# Patient Record
Sex: FEMALE | Race: WHITE | Employment: FULL TIME | URBAN - METROPOLITAN AREA
[De-identification: names, ages, dates, MRNs, and addresses within clinical notes are randomized per-mention and may not be internally consistent; named-entity substitution may affect disease eponyms.]

---

## 2017-07-01 ENCOUNTER — APPOINTMENT (OUTPATIENT)
Dept: CT IMAGING | Age: 57
End: 2017-07-01
Attending: EMERGENCY MEDICINE
Payer: COMMERCIAL

## 2017-07-01 ENCOUNTER — HOSPITAL ENCOUNTER (EMERGENCY)
Age: 57
Discharge: HOME | End: 2017-07-01
Attending: EMERGENCY MEDICINE
Payer: COMMERCIAL

## 2017-07-01 VITALS
OXYGEN SATURATION: 98 % | WEIGHT: 293 LBS | DIASTOLIC BLOOD PRESSURE: 69 MMHG | HEIGHT: 67 IN | RESPIRATION RATE: 16 BRPM | SYSTOLIC BLOOD PRESSURE: 141 MMHG | HEART RATE: 72 BPM | TEMPERATURE: 98.2 F | BODY MASS INDEX: 45.99 KG/M2

## 2017-07-01 DIAGNOSIS — R10.11 ABDOMINAL PAIN, RIGHT UPPER QUADRANT: Primary | ICD-10-CM

## 2017-07-01 LAB
ANION GAP BLD CALC-SCNC: 11 MMOL/L (ref 5–15)
BUN SERPL-MCNC: 16 MG/DL (ref 6–20)
BUN/CREAT SERPL: 15 (ref 12–20)
CALCIUM SERPL-MCNC: 9.2 MG/DL (ref 8.5–10.1)
CHLORIDE SERPL-SCNC: 103 MMOL/L (ref 97–108)
CO2 SERPL-SCNC: 24 MMOL/L (ref 21–32)
CREAT SERPL-MCNC: 1.04 MG/DL (ref 0.55–1.02)
GLUCOSE SERPL-MCNC: 96 MG/DL (ref 65–100)
POTASSIUM SERPL-SCNC: 3.7 MMOL/L (ref 3.5–5.1)
SODIUM SERPL-SCNC: 138 MMOL/L (ref 136–145)

## 2017-07-01 PROCEDURE — 80048 BASIC METABOLIC PNL TOTAL CA: CPT | Performed by: EMERGENCY MEDICINE

## 2017-07-01 PROCEDURE — 74177 CT ABD & PELVIS W/CONTRAST: CPT

## 2017-07-01 PROCEDURE — 74011636320 HC RX REV CODE- 636/320: Performed by: EMERGENCY MEDICINE

## 2017-07-01 PROCEDURE — 99281 EMR DPT VST MAYX REQ PHY/QHP: CPT

## 2017-07-01 PROCEDURE — 36415 COLL VENOUS BLD VENIPUNCTURE: CPT | Performed by: EMERGENCY MEDICINE

## 2017-07-01 RX ORDER — GUAIFENESIN 100 MG/5ML
81 LIQUID (ML) ORAL DAILY
COMMUNITY
End: 2019-10-24

## 2017-07-01 RX ORDER — ZINC GLUCONATE 10 MG
1 LOZENGE ORAL DAILY
COMMUNITY
End: 2019-08-19

## 2017-07-01 RX ORDER — CHOLECALCIFEROL TAB 125 MCG (5000 UNIT) 125 MCG
5000 TAB ORAL DAILY
COMMUNITY
End: 2019-08-19

## 2017-07-01 RX ORDER — LISINOPRIL AND HYDROCHLOROTHIAZIDE 10; 12.5 MG/1; MG/1
1 TABLET ORAL DAILY
COMMUNITY
End: 2019-10-24

## 2017-07-01 RX ORDER — METFORMIN HYDROCHLORIDE 500 MG/1
1000 TABLET ORAL 2 TIMES DAILY WITH MEALS
COMMUNITY

## 2017-07-01 RX ORDER — FLAXSEED OIL 1000 MG
1 CAPSULE ORAL DAILY
COMMUNITY
End: 2019-08-19

## 2017-07-01 RX ORDER — LANOLIN ALCOHOL/MO/W.PET/CERES
1000 CREAM (GRAM) TOPICAL DAILY
COMMUNITY
End: 2019-08-19

## 2017-07-01 RX ADMIN — IOPAMIDOL 97 ML: 755 INJECTION, SOLUTION INTRAVENOUS at 15:11

## 2017-07-01 NOTE — DISCHARGE INSTRUCTIONS
Abdominal Pain: Care Instructions  Your Care Instructions    Abdominal pain has many possible causes. Some aren't serious and get better on their own in a few days. Others need more testing and treatment. If your pain continues or gets worse, you need to be rechecked and may need more tests to find out what is wrong. You may need surgery to correct the problem. Don't ignore new symptoms, such as fever, nausea and vomiting, urination problems, pain that gets worse, and dizziness. These may be signs of a more serious problem. Your doctor may have recommended a follow-up visit in the next 8 to 12 hours. If you are not getting better, you may need more tests or treatment. The doctor has checked you carefully, but problems can develop later. If you notice any problems or new symptoms, get medical treatment right away. Follow-up care is a key part of your treatment and safety. Be sure to make and go to all appointments, and call your doctor if you are having problems. It's also a good idea to know your test results and keep a list of the medicines you take. How can you care for yourself at home? · Rest until you feel better. · To prevent dehydration, drink plenty of fluids, enough so that your urine is light yellow or clear like water. Choose water and other caffeine-free clear liquids until you feel better. If you have kidney, heart, or liver disease and have to limit fluids, talk with your doctor before you increase the amount of fluids you drink. · If your stomach is upset, eat mild foods, such as rice, dry toast or crackers, bananas, and applesauce. Try eating several small meals instead of two or three large ones. · Wait until 48 hours after all symptoms have gone away before you have spicy foods, alcohol, and drinks that contain caffeine. · Do not eat foods that are high in fat. · Avoid anti-inflammatory medicines such as aspirin, ibuprofen (Advil, Motrin), and naproxen (Aleve).  These can cause stomach upset. Talk to your doctor if you take daily aspirin for another health problem. When should you call for help? Call 911 anytime you think you may need emergency care. For example, call if:  · You passed out (lost consciousness). · You pass maroon or very bloody stools. · You vomit blood or what looks like coffee grounds. · You have new, severe belly pain. Call your doctor now or seek immediate medical care if:  · Your pain gets worse, especially if it becomes focused in one area of your belly. · You have a new or higher fever. · Your stools are black and look like tar, or they have streaks of blood. · You have unexpected vaginal bleeding. · You have symptoms of a urinary tract infection. These may include:  ¨ Pain when you urinate. ¨ Urinating more often than usual.  ¨ Blood in your urine. · You are dizzy or lightheaded, or you feel like you may faint. Watch closely for changes in your health, and be sure to contact your doctor if:  · You are not getting better after 1 day (24 hours). Where can you learn more? Go to http://marilynSeeqjuan.info/. Enter O014 in the search box to learn more about \"Abdominal Pain: Care Instructions. \"  Current as of: March 20, 2017  Content Version: 11.3  © 2828-6911 [employer]. Care instructions adapted under license by [employer] (which disclaims liability or warranty for this information). If you have questions about a medical condition or this instruction, always ask your healthcare professional. [place] [address] any warranty or liability for your use of this information. We hope that we have addressed all of your medical concerns. The examination and treatment you received in the Emergency Department were for an emergent problem and were not intended as complete care. It is important that you follow up with your healthcare provider(s) for ongoing care.  If your symptoms worsen or do not improve as expected, and you are unable to reach your usual health care provider(s), you should return to the Emergency Department. Today's healthcare is undergoing tremendous change, and patient satisfaction surveys are one of the many tools to assess the quality of medical care. You may receive a survey from the [employer] regarding your experience in the Emergency Department. I hope that your experience has been completely positive, particularly the medical care that I provided. As such, please participate in the survey; anything less than excellent does not meet my expectations or intentions. [facility] and [facility] participate in nationally recognized quality of care measures. If your blood pressure is greater than 120/80, as reported below, we urge that you seek medical care to address the potential of high blood pressure, commonly known as hypertension. Hypertension can be hereditary or can be caused by certain medical conditions, pain, stress, or \"white coat syndrome. \"       Please make an appointment with your health care provider(s) for follow up of your Emergency Department visit. VITALS:   Patient Vitals for the past 8 hrs:   Temp Pulse Resp BP SpO2   07/01/17 1315 98.2 °F (36.8 °C) 80 16 137/82 98 %          Thank you for allowing us to provide you with medical care today. We realize that you have many choices for your emergency care needs. Please choose us in the future for any continued health care needs. Krysta Nesbitt, [address]: [phone]            Recent Results (from the past 24 hour(s))   METABOLIC PANEL, BASIC    Collection Time: 07/01/17  1:55 PM   Result Value Ref Range    Sodium 138 136 - 145 mmol/L    Potassium 3.7 3.5 - 5.1 mmol/L    Chloride 103 97 - 108 mmol/L    CO2 24 21 - 32 mmol/L    Anion gap 11 5 - 15 mmol/L    Glucose 96 65 - 100 mg/dL    BUN 16 6 - 20 MG/DL    Creatinine 1.04 (H) 0.55 - 1.02 MG/DL    BUN/Creatinine ratio 15 12 - 20      GFR est AA >60 >60 ml/min/1.73m2    GFR est non-AA 55 (L) >60 ml/min/1.73m2    Calcium 9.2 8.5 - 10.1 MG/DL       Ct Abd Pelv W Cont    Result Date: 7/1/2017  CT ABDOMEN AND PELVIS WITH CONTRAST. 7/1/2017 2:45 PM INDICATION: Right flank pain. HISTORY (per electronic medical record): Breast carcinoma, uterine carcinoma, hypertension, sarcoidosis, diabetes, renal calculi. COMPARISON: None. TECHNIQUE: CT of the abdomen and pelvis was performed after the administration of 97 cc IV Isovue-370. CT dose reduction was achieved through use of a standardized protocol tailored for this examination and automatic exposure control for dose modulation. Adaptive statistical iterative reconstruction (ASIR) was utilized. FINDINGS: The study is limited by patient body habitus: redundant soft tissue causes photon starvation, and the flank(s) touching the gantry cause(s) beam hardening artifact. Inferior chest: There are sub-6 mm nodules in the bilateral lower lung fields. The heart size is normal. Lymphadenopathy: A portacaval lymph node is enlarged, measuring 21 mm in short axis and 21 x 43 x 61 mm in 3 dimensions. Multiple supraceliac lymph nodes are also enlarged. There are more mildly enlarged inferior retroperitoneal lymph nodes. The largest of these is a right common iliac lymph node measuring 12 mm in short axis on image 3-64. A right cardiophrenic angle/pericaval lymph node is mildly enlarged (10 mm on image 3-13). Visible mesenteric lymph nodes are at the upper limits of normal in size. Abdomen: No urinary calculi. The pancreas is fatty infiltrated. The heart size is normal. The gallbladder is not visible and likely surgically absent. The distal esophagus, stomach, duodenum, liver, spleen, adrenals, and kidneys are normal. Rotation of the right kidney into the axial plane is a normal variant. Pelvis:  The small bowel, ileocecal junction, colon, and bladder are normal. The appendix is not visualized; no pericecal inflammatory process. Post hysterectomy. No free air or fluid. IMPRESSION: 1. Upper abdominal, retroperitoneal, and cardiophrenic angle lymphadenopathy. Metastases favored. 2. Indeterminate sub-6 mm lower lung nodules. Metastases possible. 3. No urinary calculi.

## 2017-07-01 NOTE — ED TRIAGE NOTES
Pt ambulates to treatment area she states that for the past several months she has had tenderness in her back and since the end of May she has noticed a dull aching in the RUQ. She denies any N/V/D or constipation. Denies any urinary symptoms. She has had a lot of recent changes to include change in her bed, position that she sleeps now she is sleeping on the right side not her left. She has been moving furniture and under a lot of stress. She is very concerned about cancer due to the fact she has had breast and uterine.   Dr Otis Tilley into evaluate

## 2017-07-01 NOTE — ED NOTES
Pt given discharge instructions by Dr Ebony Linton she verbalizes an understanding pt stable at time of discharge ambulates to [facility]

## 2017-07-01 NOTE — ED PROVIDER NOTES
Patient is a 49 y.o. female presenting with flank pain. The history is provided by the patient. Flank Pain    This is a chronic problem. Episode onset: months. The problem has been gradually worsening. Patient reports not work related injury. The pain is associated with no known injury. The pain is present in the right side. Radiates to: right flank and RUQ abdominal pain. Pertinent negatives include no bowel incontinence, no perianal numbness, no bladder incontinence and no dysuria. Risk factors include a history of cancer. Past Medical History:   Diagnosis Date    Anxiety     Breast cancer ([address]     right    Bronchitis     Diabetes [facility]     Hypertension     Kidney stones     Pneumonia     Sarcoidosis [facility]     Uterine cancer ([facility]        Past Surgical History:   Procedure Laterality Date    HX BARTHOLIN CYST MARSUPIALIZATION      HX BREAST LUMPECTOMY      right    HX CHOLECYSTECTOMY      HX HYSTERECTOMY           History reviewed. No pertinent family history. Social History     Social History    Marital status: UNKNOWN     Spouse name: N/A    Number of children: N/A    Years of education: N/A     Occupational History    Not on file. Social History Main Topics    Smoking status: Never Smoker    Smokeless tobacco: Never Used    Alcohol use Yes      Comment: rare    Drug use: No    Sexual activity: Not on file     Other Topics Concern    Not on file     Social History Narrative    No narrative on file         ALLERGIES: Prednisone    Review of Systems   Gastrointestinal: Negative for bowel incontinence. Genitourinary: Positive for flank pain. Negative for bladder incontinence and dysuria.        Vitals:    07/01/17 1315   BP: 137/82   Pulse: 80   Resp: 16   Temp: 98.2 °F (36.8 °C)   SpO2: 98%   Weight: 132.9 kg (293 lb)   Height: 5' 7\" (1.702 m)            Physical Exam     MDM  Number of Diagnoses or Management Options  Diagnosis management comments: Patient with right flank pain and RUQ pain (minimal) she has a remote hx of cancer and is worried it has returned, this could be renal colic or biliary colic or musculoskeletal pain. CT abd with IV contrast and re-eval.    1535 - patient Ct results discussed, patient has f/u on 7/5 with her oncologist at [facility] scheduled. Amount and/or Complexity of Data Reviewed  Clinical lab tests: reviewed and ordered  Tests in the radiology section of CPT®: reviewed and ordered    Patient Progress  Patient progress: stable    ED Course       Procedures    Final result (Exam End: 7/1/2017  2:45 PM) Open        Study Result      CT ABDOMEN AND PELVIS WITH CONTRAST. 7/1/2017 2:45 PM      INDICATION: Right flank pain. HISTORY (per electronic medical record): Breast carcinoma, uterine carcinoma,  hypertension, sarcoidosis, diabetes, renal calculi.     COMPARISON: None.     TECHNIQUE: CT of the abdomen and pelvis was performed after the administration  of 97 cc IV Isovue-370. CT dose reduction was achieved through use of a  standardized protocol tailored for this examination and automatic exposure  control for dose modulation. Adaptive statistical iterative reconstruction  (ASIR) was utilized.     FINDINGS: The study is limited by patient body habitus: redundant soft tissue  causes photon starvation, and the flank(s) touching the gantry cause(s) beam  hardening artifact.     Inferior chest: There are sub-6 mm nodules in the bilateral lower lung fields. The heart size is normal.     Lymphadenopathy: A portacaval lymph node is enlarged, measuring 21 mm in short  axis and 21 x 43 x 61 mm in 3 dimensions. Multiple supraceliac lymph nodes are  also enlarged. There are more mildly enlarged inferior retroperitoneal lymph  nodes. The largest of these is a right common iliac lymph node measuring 12 mm  in short axis on image 3-64. A right cardiophrenic angle/pericaval lymph node is  mildly enlarged (10 mm on image 3-13).  Visible mesenteric lymph nodes are at the  upper limits of normal in size.     Abdomen: No urinary calculi. The pancreas is fatty infiltrated. The heart size  is normal. The gallbladder is not visible and likely surgically absent. The  distal esophagus, stomach, duodenum, liver, spleen, adrenals, and kidneys are  normal. Rotation of the right kidney into the axial plane is a normal variant.     Pelvis: The small bowel, ileocecal junction, colon, and bladder are normal. The  appendix is not visualized; no pericecal inflammatory process. Post  hysterectomy. No free air or fluid.     IMPRESSION  IMPRESSION:   1. Upper abdominal, retroperitoneal, and cardiophrenic angle lymphadenopathy. Metastases favored. 2. Indeterminate sub-6 mm lower lung nodules. Metastases possible. 3. No urinary calculi.

## 2018-11-26 ENCOUNTER — HOSPITAL ENCOUNTER (OUTPATIENT)
Dept: GENERAL RADIOLOGY | Age: 58
Discharge: HOME | End: 2018-11-26
Attending: ORTHOPAEDIC SURGERY
Payer: COMMERCIAL

## 2018-11-26 DIAGNOSIS — M48.061 LUMBAR FORAMINAL STENOSIS: ICD-10-CM

## 2018-11-26 DIAGNOSIS — M54.31 RIGHT SIDED SCIATICA: ICD-10-CM

## 2018-11-26 DIAGNOSIS — M16.11 PRIMARY OSTEOARTHRITIS OF RIGHT HIP: ICD-10-CM

## 2018-11-26 PROCEDURE — 74011250636 HC RX REV CODE- 250/636: Performed by: RADIOLOGY

## 2018-11-26 PROCEDURE — 77002 NEEDLE LOCALIZATION BY XRAY: CPT

## 2018-11-26 PROCEDURE — 77030014113 HC TY ARTHROGRM BD -A

## 2018-11-26 PROCEDURE — 74011636320 HC RX REV CODE- 636/320: Performed by: RADIOLOGY

## 2018-11-26 PROCEDURE — 77030003666 HC NDL SPINAL BD -A

## 2018-11-26 PROCEDURE — 74011000250 HC RX REV CODE- 250: Performed by: RADIOLOGY

## 2018-11-26 RX ORDER — BUPIVACAINE HYDROCHLORIDE 5 MG/ML
5 INJECTION, SOLUTION EPIDURAL; INTRACAUDAL
Status: COMPLETED | OUTPATIENT
Start: 2018-11-26 | End: 2018-11-26

## 2018-11-26 RX ORDER — LIDOCAINE HYDROCHLORIDE 10 MG/ML
10 INJECTION, SOLUTION EPIDURAL; INFILTRATION; INTRACAUDAL; PERINEURAL
Status: COMPLETED | OUTPATIENT
Start: 2018-11-26 | End: 2018-11-26

## 2018-11-26 RX ORDER — TRIAMCINOLONE ACETONIDE 40 MG/ML
40 INJECTION, SUSPENSION INTRA-ARTICULAR; INTRAMUSCULAR
Status: COMPLETED | OUTPATIENT
Start: 2018-11-26 | End: 2018-11-26

## 2018-11-26 RX ADMIN — LIDOCAINE HYDROCHLORIDE 5 ML: 10 INJECTION, SOLUTION EPIDURAL; INFILTRATION; INTRACAUDAL; PERINEURAL at 14:57

## 2018-11-26 RX ADMIN — IOHEXOL 10 ML: 180 INJECTION INTRAVENOUS at 14:30

## 2018-11-26 RX ADMIN — TRIAMCINOLONE ACETONIDE 40 MG: 40 INJECTION, SUSPENSION INTRA-ARTICULAR; INTRAMUSCULAR at 14:30

## 2018-11-26 RX ADMIN — BUPIVACAINE HYDROCHLORIDE 25 MG: 5 INJECTION, SOLUTION EPIDURAL; INTRACAUDAL; PERINEURAL at 14:30

## 2019-08-14 ENCOUNTER — HOSPITAL ENCOUNTER (OUTPATIENT)
Dept: INTERVENTIONAL RADIOLOGY/VASCULAR | Age: 59
Discharge: HOME | End: 2019-08-14
Attending: ORTHOPAEDIC SURGERY | Admitting: ORTHOPAEDIC SURGERY
Payer: COMMERCIAL

## 2019-08-14 VITALS
TEMPERATURE: 98.2 F | WEIGHT: 292 LBS | HEART RATE: 101 BPM | HEIGHT: 67 IN | RESPIRATION RATE: 16 BRPM | BODY MASS INDEX: 45.83 KG/M2 | DIASTOLIC BLOOD PRESSURE: 72 MMHG | OXYGEN SATURATION: 97 % | SYSTOLIC BLOOD PRESSURE: 152 MMHG

## 2019-08-14 DIAGNOSIS — Z78.9 WEIGHT ABOVE 97TH PERCENTILE: ICD-10-CM

## 2019-08-14 DIAGNOSIS — M54.31 SCIATICA OF RIGHT SIDE: ICD-10-CM
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
## 2019-08-14 PROCEDURE — 74011250636 HC RX REV CODE- 250/636: Performed by: RADIOLOGY

## 2019-08-14 PROCEDURE — 74011636320 HC RX REV CODE- 636/320: Performed by: RADIOLOGY

## 2019-08-14 PROCEDURE — 64483 NJX AA&/STRD TFRM EPI L/S 1: CPT

## 2019-08-14 RX ORDER — ATORVASTATIN CALCIUM 10 MG/1
10 TABLET, FILM COATED ORAL
COMMUNITY

## 2019-08-14 RX ORDER — LIDOCAINE HYDROCHLORIDE 10 MG/ML
10 INJECTION, SOLUTION EPIDURAL; INFILTRATION; INTRACAUDAL; PERINEURAL ONCE
Status: COMPLETED | OUTPATIENT
Start: 2019-08-14 | End: 2019-08-14

## 2019-08-14 RX ORDER — DEXAMETHASONE SODIUM PHOSPHATE 10 MG/ML
10 INJECTION INTRAMUSCULAR; INTRAVENOUS ONCE
Status: COMPLETED | OUTPATIENT
Start: 2019-08-14 | End: 2019-08-14

## 2019-08-14 RX ADMIN — DEXAMETHASONE SODIUM PHOSPHATE 10 MG: 10 INJECTION INTRAMUSCULAR; INTRAVENOUS at 15:39

## 2019-08-14 RX ADMIN — LIDOCAINE HYDROCHLORIDE 10 ML: 10 INJECTION, SOLUTION EPIDURAL; INFILTRATION; INTRACAUDAL; PERINEURAL at 15:39

## 2019-08-14 RX ADMIN — IOHEXOL 10 ML: 180 INJECTION INTRAVENOUS at 15:39

## 2019-08-14 NOTE — DISCHARGE INSTRUCTIONS
Steroid Injection Discharge Instructions  [address] [place] [address] Angiography Department    Radiologist:   Mercy Kidd MD  Date:   August 14, 2019    General Information:   A steroid injection was performed today, placing a combination of a steroid and an anesthetic (numbing medicine) into the space around the nerves of your spine. This is done to treat back pain. It may take 7-10 days for the injection to reach its full potential.  This procedure can be done at any level of the spinal column, depending on where your pain is. Your doctor will have ordered the appropriate level to be treated prior to your coming in for the procedure. Home Care Instructions: You can resume your regular diet. Resume your normal medications. Do not drink alcohol today. Do not drive for 12 hours and there is not numbness or tingling. You may notice that you have to use your pain medications less after your injection. Some people do not notice much of a change in their pain after the first injection and require a second injection. This is why these injections are sometimes ordered in a series of three. Keep the puncture site clean and dry for 24 hours, and then you may remove the dressing. Showering is acceptable after the bandage is removed. Rest today be aware there maybe numbness or tingling that may last up to 12 hours after the inject. Side Effects of medications used today have been reviewed. Please call our department with any hives, severe itching, dizziness, nausea, or any unusual symptoms. Call If:   You should call your Physician and/or the Radiology Nurse if you have any bleeding other than a small spot on your bandage. Call if you have any signs of infection: fever, increased pain at the puncture site, confusion, or a headache that worsens when you stand and eases when lying flat. Follow-Up Instructions:  Please see your ordering doctor as he/she has requested.   Let your doctor know if you have relief from your pain so they may schedule another injection for you if it is indicated. To Reach Us:  Should you experience any significant changes, please call [phone] between the hours of 7:30 am and 10 pm or [phone] after hours.  After hours, ask the  to page the [address] [place] [address] Technologist, and describe the problem to the technologist.     Gisela Shankar RN      Patient signature:_____________________________________________________________________

## 2019-08-19 ENCOUNTER — DOCUMENTATION ONLY (OUTPATIENT)
Dept: SURGERY | Age: 59
End: 2019-08-19

## 2019-08-19 ENCOUNTER — OFFICE VISIT (OUTPATIENT)
Dept: SURGERY | Age: 59
End: 2019-08-19

## 2019-08-19 VITALS
WEIGHT: 292 LBS | SYSTOLIC BLOOD PRESSURE: 129 MMHG | BODY MASS INDEX: 45.83 KG/M2 | DIASTOLIC BLOOD PRESSURE: 52 MMHG | HEART RATE: 70 BPM | HEIGHT: 67 IN

## 2019-08-19 DIAGNOSIS — N60.91 ATYPICAL DUCTAL HYPERPLASIA OF RIGHT BREAST: ICD-10-CM

## 2019-08-19 DIAGNOSIS — D24.1 PAPILLOMA OF RIGHT BREAST: ICD-10-CM

## 2019-08-19 DIAGNOSIS — Z91.89 AT HIGH RISK FOR BREAST CANCER: Primary | ICD-10-CM

## 2019-08-19 DIAGNOSIS — Z86.000 HISTORY OF DUCTAL CARCINOMA IN SITU (DCIS) OF BREAST: ICD-10-CM

## 2019-08-19 PROBLEM — E66.01 OBESITY, MORBID (HCC): Status: ACTIVE | Noted: 2019-08-19

## 2019-08-19 RX ORDER — TRAMADOL HYDROCHLORIDE 50 MG/1
50 TABLET ORAL
COMMUNITY

## 2019-08-19 RX ORDER — ALPRAZOLAM 0.25 MG/1
0.25 TABLET ORAL
COMMUNITY

## 2019-08-19 NOTE — PROGRESS NOTES
HISTORY OF PRESENT ILLNESS  Manuela Nicole is a 66 y.o. female. HPI    NEW patient presents for consultation at the request of Dr. Art Manning for RIGHT breast papilloma and ADH. She is not feeling any breast lumps, has no nipple discharge/retraction or skin change. She does have some RIGHT nipple discharge, but this is never spontaneous. She has a history of RIGHT breast DCIS in 2012, S/P lumpectomy, no XRT or Tamoxifen/AI. Also has a history of uterine cancer in 2013.,  FH is significant for two paternal aunt who had breast cancer (older age). Several paternal uncles had prostate cancer. Father had bladder and lung. Sister had her nipple removed for chronic issues, never diagnosed with cancer. BILATERAL diagnostic mammogram 8/1/19 at [facility], BIRADS 4B. Stereotactic biopsy 8/12/19. Review of Systems   Constitutional: Negative. HENT: Positive for hearing loss. Eyes: Negative. Respiratory: Positive for cough. Cardiovascular: Negative. Gastrointestinal: Negative. Genitourinary: Negative. Musculoskeletal: Positive for back pain and joint pain. Skin: Negative. Neurological: Positive for tingling. Endo/Heme/Allergies: Negative. Psychiatric/Behavioral: The patient is nervous/anxious.         Physical Exam    ASSESSMENT and PLAN  {ASSESSMENT/PLAN:43802}

## 2019-08-19 NOTE — PROGRESS NOTES
HISTORY OF PRESENT ILLNESS  Dionna Christiansen is a 66 y.o. female. HPI  NEW patient presents for consultation at the request of Dr. Cristino Sheikh for RIGHT breast papilloma and ADH. She is not feeling any breast lumps, has no nipple discharge/retraction or skin change. She does have some RIGHT nipple discharge, but this is never spontaneous. She has a history of RIGHT breast DCIS in 2012, S/P lumpectomy, no XRT or Tamoxifen/AI. Also has a history of uterine cancer in 2013. FH is significant for two paternal aunt who had breast cancer (older age). Several paternal uncles had prostate cancer. Father had bladder and lung. Sister had her nipple removed for chronic issues, never diagnosed with cancer. BILATERAL diagnostic mammogram 8/1/19 at [facility], BIRADS 4B. Stereotactic biopsy 8/12/19. PATH: Papilloma with calcifications, ADH, fibrocystic changes.       Past Medical History:   Diagnosis Date    Anxiety     Breast cancer ([facility] [address] 2012    right DCIS    Bronchitis     Diabetes ([facility] [address]     Hypertension     Kidney stones     Pneumonia     Sarcoidosis     Uterine cancer ([facility] [address] 2013       Past Surgical History:   Procedure Laterality Date    HX BARTHOLIN CYST MARSUPIALIZATION      HX BREAST LUMPECTOMY      right    HX CHOLECYSTECTOMY      HX HYSTERECTOMY      IR INJ FORAMIN EPID LUMB ANES/STER SNGL  8/14/2019       Social History     Socioeconomic History    Marital status: UNKNOWN     Spouse name: Not on file    Number of children: Not on file    Years of education: Not on file    Highest education level: Not on file   Occupational History    Not on file   Social Needs    Financial resource strain: Not on file    Food insecurity:     Worry: Not on file     Inability: Not on file    Transportation needs:     Medical: Not on file     Non-medical: Not on file   Tobacco Use    Smoking status: Never Smoker    Smokeless tobacco: Never Used   Substance and Sexual Activity    Alcohol use:  Yes Comment: rare    Drug use: No    Sexual activity: Not on file   Lifestyle    Physical activity:     Days per week: Not on file     Minutes per session: Not on file    Stress: Not on file   Relationships    Social connections:     Talks on phone: Not on file     Gets together: Not on file     Attends [employer] service: Not on file     Active member of club or organization: Not on file     Attends meetings of clubs or organizations: Not on file     Relationship status: Not on file    Intimate partner violence:     Fear of current or ex partner: Not on file     Emotionally abused: Not on file     Physically abused: Not on file     Forced sexual activity: Not on file   Other Topics Concern    Not on file   Social History Narrative    Not on file       Current Outpatient Medications on File Prior to Visit   Medication Sig Dispense Refill    traMADol (ULTRAM) 50 mg tablet Take 50 mg by mouth every six (6) hours as needed for Pain.  ALPRAZolam (XANAX) 0.25 mg tablet Take  by mouth.  atorvastatin (LIPITOR) 10 mg tablet Take 10 mg by mouth daily.  lisinopril-hydroCHLOROthiazide (PRINZIDE, ZESTORETIC) 10-12.5 mg per tablet Take 1 Tab by mouth daily.  metFORMIN (GLUCOPHAGE) 500 mg tablet Take 500 mg by mouth two (2) times daily (with meals).  B.infantis-B.ani-B.long-B.bifi (PROBIOTIC 4X) 10-15 mg TbEC Take 1 Tab by mouth daily.  aspirin 81 mg chewable tablet Take 81 mg by mouth daily. No current facility-administered medications on file prior to visit. Allergies   Allergen Reactions    Prednisone Other (comments)     Pressure in her eyes like glaucoma all steroids       OB History    None      Obstetric Comments   Menarche 7, LMP 2012, # of children 3, age of 4st delivery 25, Hysterectomy/oophorectomy Yes/Yes, Breast bx Yes, RIGHT , history of breast feeding No, BCP Yes, in the past, Hormone therapy No               ROS  Constitutional: Negative.     HENT: Positive for hearing loss. Eyes: Negative. Respiratory: Positive for cough. Cardiovascular: Negative. Gastrointestinal: Negative. Genitourinary: Negative. Musculoskeletal: Positive for back pain and joint pain. Skin: Negative. Neurological: Positive for tingling. Endo/Heme/Allergies: Negative. Psychiatric/Behavioral: The patient is nervous/anxious. Physical Exam   Cardiovascular: Normal rate, regular rhythm and normal heart sounds. Pulmonary/Chest: Breath sounds normal. Right breast exhibits no inverted nipple, no mass, no nipple discharge, no skin change and no tenderness. Left breast exhibits no inverted nipple, no mass, no nipple discharge, no skin change and no tenderness. Breasts are symmetrical.       Lymphadenopathy:     She has no cervical adenopathy. Right cervical: No superficial cervical, no deep cervical and no posterior cervical adenopathy present. Left cervical: No superficial cervical, no deep cervical and no posterior cervical adenopathy present. She has no axillary adenopathy. Right axillary: No pectoral and no lateral adenopathy present. Left axillary: No pectoral and no lateral adenopathy present. BREAST ULTRASOUND  Indication: RIGHT breast papilloma and ADH per bx  Technique: The area was scanned using a high-frequency linear-array near-field transducer  Findings: Bx clip at RIGHT breast 7:00, in the medial edge of lumpectomy cavity  Impression: Papilloma and ADH per bx  Disposition: Excision      ASSESSMENT and PLAN    ICD-10-CM ICD-9-CM    1. At high risk for breast cancer Z91.89 V49.89 MRI BREAST BI W WO CONT   2. History of ductal carcinoma in situ (DCIS) of breast Z86.000 V13.89 MRI BREAST BI W WO CONT   3. Papilloma of right breast D24.1 217    4. Atypical ductal hyperplasia of right breast N60.91 610.8       New patient presents for evaluation of RIGHT breast papilloma and ADH, and is doing well overall.  Well healed incision s/p lumpectomy. Thickening on exam at RIGHT breast LOQ at lumpectomy site. US visualizes bx clip at RIGHT breast 7:00, in the medial edge of lumpectomy cavity. Reviewed bx PATH. Discussed excisional bx to remove bx site and clip, to confirm PATH and rule out malignancy. Based on history, recommend breast MRI prior to any surgery. Pt notes preference for imagine and treatment at [facility] or [facility]. Will order wide bore MRI at [place], and f/u with results and for further planning, which may include surgery or additional pre-op bx. This plan was reviewed with the patient and patient agrees. All questions were answered.     Written by Kyra Maguire, as dictated by Dr. Dorothy Gould MD.

## 2019-08-19 NOTE — PROGRESS NOTES
Type of Film: [x] CD [] FILMS  Type of Test: [] MRI [x] MAMMO  From: [facility]  Given to: [facility]  To be Downloaded into PACS:  YES    Patient is to be scheduled for a breast MRI.

## 2019-08-19 NOTE — LETTER
8/21/2019 8:32 AM 
 
Patient:  Jame Quintanilla YOB: 1960 Date of Visit: 8/19/2019 Dear Dr. Padmini Segovia: Thank you for referring Ms. Jame Quintanilla to me for evaluation/treatment. Below are the relevant portions of my assessment and plan of care. HISTORY OF PRESENT ILLNESS Kalie Mohamud is a 66 y.o. female. HPI 
NEW patient presents for consultation at the request of Dr. Kaylie Slade for RIGHT breast papilloma and ADH. She is not feeling any breast lumps, has no nipple discharge/retraction or skin change. She does have some RIGHT nipple discharge, but this is never spontaneous. She has a history of RIGHT breast DCIS in 2012, S/P lumpectomy, no XRT or Tamoxifen/AI. Also has a history of uterine cancer in 2013. FH is significant for two paternal aunt who had breast cancer (older age). Several paternal uncles had prostate cancer. Father had bladder and lung. Sister had her nipple removed for chronic issues, never diagnosed with cancer. BILATERAL diagnostic mammogram 8/1/19 at [facility], BIRADS 4B. Stereotactic biopsy 8/12/19. PATH: Papilloma with calcifications, ADH, fibrocystic changes. 
  
 
Past Medical History:  
Diagnosis Date  Anxiety  Breast cancer ([address] 2012  
 right DCIS  
 Bronchitis  Diabetes ([facility] [address]  Hypertension  Kidney stones  Pneumonia  Sarcoidosis  Uterine cancer ([facility] [address] 2013 Past Surgical History:  
Procedure Laterality Date  HX BARTHOLIN CYST MARSUPIALIZATION    
 HX BREAST LUMPECTOMY    
 right  HX CHOLECYSTECTOMY  HX HYSTERECTOMY  IR INJ FORAMIN EPID LUMB ANES/STER SNGL  8/14/2019 Social History Socioeconomic History  Marital status: UNKNOWN Spouse name: Not on file  Number of children: Not on file  Years of education: Not on file  Highest education level: Not on file Occupational History  Not on file Social Needs  Financial resource strain: Not on file  Food insecurity:  
  Worry: Not on file Inability: Not on file  Transportation needs:  
  Medical: Not on file Non-medical: Not on file Tobacco Use  Smoking status: Never Smoker  Smokeless tobacco: Never Used Substance and Sexual Activity  Alcohol use: Yes Comment: rare  Drug use: No  
 Sexual activity: Not on file Lifestyle  Physical activity:  
  Days per week: Not on file Minutes per session: Not on file  Stress: Not on file Relationships  Social connections:  
  Talks on phone: Not on file Gets together: Not on file Attends Religion service: Not on file Active member of club or organization: Not on file Attends meetings of clubs or organizations: Not on file Relationship status: Not on file  Intimate partner violence:  
  Fear of current or ex partner: Not on file Emotionally abused: Not on file Physically abused: Not on file Forced sexual activity: Not on file Other Topics Concern  Not on file Social History Narrative  Not on file Current Outpatient Medications on File Prior to Visit Medication Sig Dispense Refill  traMADol (ULTRAM) 50 mg tablet Take 50 mg by mouth every six (6) hours as needed for Pain.  ALPRAZolam (XANAX) 0.25 mg tablet Take  by mouth.  atorvastatin (LIPITOR) 10 mg tablet Take 10 mg by mouth daily.  lisinopril-hydroCHLOROthiazide (PRINZIDE, ZESTORETIC) 10-12.5 mg per tablet Take 1 Tab by mouth daily.  metFORMIN (GLUCOPHAGE) 500 mg tablet Take 500 mg by mouth two (2) times daily (with meals).  B.infantis-B.ani-B.long-B.bifi (PROBIOTIC 4X) 10-15 mg TbEC Take 1 Tab by mouth daily.  aspirin 81 mg chewable tablet Take 81 mg by mouth daily. No current facility-administered medications on file prior to visit. Allergies Allergen Reactions  Prednisone Other (comments) Pressure in her eyes like glaucoma all steroids OB History None Obstetric Comments Menarche 11, LMP 2012, # of children 1, age of 4st delivery 25, Hysterectomy/oophorectomy Yes/Yes, Breast bx Yes, RIGHT , history of breast feeding No, BCP Yes, in the past, Hormone therapy No 
  
  
  
 
 
ROS Constitutional: Negative. HENT: Positive for hearing loss. Eyes: Negative. Respiratory: Positive for cough. Cardiovascular: Negative. Gastrointestinal: Negative. Genitourinary: Negative. Musculoskeletal: Positive for back pain and joint pain. Skin: Negative. Neurological: Positive for tingling. Endo/Heme/Allergies: Negative. Psychiatric/Behavioral: The patient is nervous/anxious. Physical Exam  
Cardiovascular: Normal rate, regular rhythm and normal heart sounds. Pulmonary/Chest: Breath sounds normal. Right breast exhibits no inverted nipple, no mass, no nipple discharge, no skin change and no tenderness. Left breast exhibits no inverted nipple, no mass, no nipple discharge, no skin change and no tenderness. Breasts are symmetrical.  
 
 
Lymphadenopathy:  
  She has no cervical adenopathy. Right cervical: No superficial cervical, no deep cervical and no posterior cervical adenopathy present. Left cervical: No superficial cervical, no deep cervical and no posterior cervical adenopathy present. She has no axillary adenopathy. Right axillary: No pectoral and no lateral adenopathy present. Left axillary: No pectoral and no lateral adenopathy present. BREAST ULTRASOUND Indication: RIGHT breast papilloma and ADH per bx Technique: The area was scanned using a high-frequency linear-array near-field transducer Findings: Bx clip at RIGHT breast 7:00, in the medial edge of lumpectomy cavity Impression: Papilloma and ADH per bx Disposition: Excision ASSESSMENT and PLAN 
  ICD-10-CM ICD-9-CM 1. At high risk for breast cancer Z91.89 V49.89 MRI BREAST BI W WO CONT 2. History of ductal carcinoma in situ (DCIS) of breast Z86.000 V13.89 MRI BREAST BI W WO CONT 3. Papilloma of right breast D24.1 217   
4. Atypical ductal hyperplasia of right breast N60.91 610.8 New patient presents for evaluation of RIGHT breast papilloma and ADH, and is doing well overall. Well healed incision s/p lumpectomy. Thickening on exam at RIGHT breast LOQ at lumpectomy site. US visualizes bx clip at RIGHT breast 7:00, in the medial edge of lumpectomy cavity. Reviewed bx PATH. Discussed excisional bx to remove bx site and clip, to confirm PATH and rule out malignancy. Based on history, recommend breast MRI prior to any surgery. Pt notes preference for imagine and treatment at [facility] or [facility]. Will order wide bore MRI at [place], and f/u with results and for further planning, which may include surgery or additional pre-op bx. This plan was reviewed with the patient and patient agrees. All questions were answered. Written by Frank Zhang, as dictated by Dr. Malathi Rios MD. 
 
 
 
If you have questions, please do not hesitate to call me. I look forward to following Ms. Jojosheri Avila along with you.  
 
 
 
Sincerely, 
 
 
Jordan Marquez MD

## 2019-08-21 NOTE — COMMUNICATION BODY
HISTORY OF PRESENT ILLNESS  Dionna Christiansen is a 66 y.o. female. HPI  NEW patient presents for consultation at the request of Dr. Cristino Sheikh for RIGHT breast papilloma and ADH. She is not feeling any breast lumps, has no nipple discharge/retraction or skin change. She does have some RIGHT nipple discharge, but this is never spontaneous. She has a history of RIGHT breast DCIS in 2012, S/P lumpectomy, no XRT or Tamoxifen/AI. Also has a history of uterine cancer in 2013. FH is significant for two paternal aunt who had breast cancer (older age). Several paternal uncles had prostate cancer. Father had bladder and lung. Sister had her nipple removed for chronic issues, never diagnosed with cancer. BILATERAL diagnostic mammogram 8/1/19 at [address], BIRADS 4B. Stereotactic biopsy 8/12/19. PATH: Papilloma with calcifications, ADH, fibrocystic changes.       Past Medical History:   Diagnosis Date    Anxiety     Breast cancer ([address] 2012    right DCIS    Bronchitis     Diabetes ([facility] [address]     Hypertension     Kidney stones     Pneumonia     Sarcoidosis     Uterine cancer ([facility] [address] 2013       Past Surgical History:   Procedure Laterality Date    HX BARTHOLIN CYST MARSUPIALIZATION      HX BREAST LUMPECTOMY      right    HX CHOLECYSTECTOMY      HX HYSTERECTOMY      IR INJ FORAMIN EPID LUMB ANES/STER SNGL  8/14/2019       Social History     Socioeconomic History    Marital status: UNKNOWN     Spouse name: Not on file    Number of children: Not on file    Years of education: Not on file    Highest education level: Not on file   Occupational History    Not on file   Social Needs    Financial resource strain: Not on file    Food insecurity:     Worry: Not on file     Inability: Not on file    Transportation needs:     Medical: Not on file     Non-medical: Not on file   Tobacco Use    Smoking status: Never Smoker    Smokeless tobacco: Never Used   Substance and Sexual Activity    Alcohol use:  Yes Comment: rare    Drug use: No    Sexual activity: Not on file   Lifestyle    Physical activity:     Days per week: Not on file     Minutes per session: Not on file    Stress: Not on file   Relationships    Social connections:     Talks on phone: Not on file     Gets together: Not on file     Attends [employer] service: Not on file     Active member of club or organization: Not on file     Attends meetings of clubs or organizations: Not on file     Relationship status: Not on file    Intimate partner violence:     Fear of current or ex partner: Not on file     Emotionally abused: Not on file     Physically abused: Not on file     Forced sexual activity: Not on file   Other Topics Concern    Not on file   Social History Narrative    Not on file       Current Outpatient Medications on File Prior to Visit   Medication Sig Dispense Refill    traMADol (ULTRAM) 50 mg tablet Take 50 mg by mouth every six (6) hours as needed for Pain.  ALPRAZolam (XANAX) 0.25 mg tablet Take  by mouth.  atorvastatin (LIPITOR) 10 mg tablet Take 10 mg by mouth daily.  lisinopril-hydroCHLOROthiazide (PRINZIDE, ZESTORETIC) 10-12.5 mg per tablet Take 1 Tab by mouth daily.  metFORMIN (GLUCOPHAGE) 500 mg tablet Take 500 mg by mouth two (2) times daily (with meals).  B.infantis-B.ani-B.long-B.bifi (PROBIOTIC 4X) 10-15 mg TbEC Take 1 Tab by mouth daily.  aspirin 81 mg chewable tablet Take 81 mg by mouth daily. No current facility-administered medications on file prior to visit. Allergies   Allergen Reactions    Prednisone Other (comments)     Pressure in her eyes like glaucoma all steroids       OB History    None      Obstetric Comments   Menarche 7, LMP 2012, # of children 3, age of 4st delivery 25, Hysterectomy/oophorectomy Yes/Yes, Breast bx Yes, RIGHT , history of breast feeding No, BCP Yes, in the past, Hormone therapy No               ROS  Constitutional: Negative.     HENT: Positive for hearing loss. Eyes: Negative. Respiratory: Positive for cough. Cardiovascular: Negative. Gastrointestinal: Negative. Genitourinary: Negative. Musculoskeletal: Positive for back pain and joint pain. Skin: Negative. Neurological: Positive for tingling. Endo/Heme/Allergies: Negative. Psychiatric/Behavioral: The patient is nervous/anxious. Physical Exam   Cardiovascular: Normal rate, regular rhythm and normal heart sounds. Pulmonary/Chest: Breath sounds normal. Right breast exhibits no inverted nipple, no mass, no nipple discharge, no skin change and no tenderness. Left breast exhibits no inverted nipple, no mass, no nipple discharge, no skin change and no tenderness. Breasts are symmetrical.       Lymphadenopathy:     She has no cervical adenopathy. Right cervical: No superficial cervical, no deep cervical and no posterior cervical adenopathy present. Left cervical: No superficial cervical, no deep cervical and no posterior cervical adenopathy present. She has no axillary adenopathy. Right axillary: No pectoral and no lateral adenopathy present. Left axillary: No pectoral and no lateral adenopathy present. BREAST ULTRASOUND  Indication: RIGHT breast papilloma and ADH per bx  Technique: The area was scanned using a high-frequency linear-array near-field transducer  Findings: Bx clip at RIGHT breast 7:00, in the medial edge of lumpectomy cavity  Impression: Papilloma and ADH per bx  Disposition: Excision      ASSESSMENT and PLAN    ICD-10-CM ICD-9-CM    1. At high risk for breast cancer Z91.89 V49.89 MRI BREAST BI W WO CONT   2. History of ductal carcinoma in situ (DCIS) of breast Z86.000 V13.89 MRI BREAST BI W WO CONT   3. Papilloma of right breast D24.1 217    4. Atypical ductal hyperplasia of right breast N60.91 610.8       New patient presents for evaluation of RIGHT breast papilloma and ADH, and is doing well overall.  Well healed incision s/p lumpectomy. Thickening on exam at RIGHT breast LOQ at lumpectomy site. US visualizes bx clip at RIGHT breast 7:00, in the medial edge of lumpectomy cavity. Reviewed bx PATH. Discussed excisional bx to remove bx site and clip, to confirm PATH and rule out malignancy. Based on history, recommend breast MRI prior to any surgery. Pt notes preference for imagine and treatment at [facility] or [place]. Will order wide bore MRI at [place], and f/u with results and for further planning, which may include surgery or additional pre-op bx. This plan was reviewed with the patient and patient agrees. All questions were answered.     Written by Betsy Barry, as dictated by Dr. Edna Giraldo MD.

## 2019-08-28 ENCOUNTER — DOCUMENTATION ONLY (OUTPATIENT)
Dept: SURGERY | Age: 59
End: 2019-08-28

## 2019-08-28 NOTE — PROGRESS NOTES
Type of Film: [x] CD [] FILMS  Type of Test: [] MRI [x] MAMMO  From: [address]  Given to: Placed in shredder box  To be Downloaded into PACS:  YES    These have already been downloaded to PACS for patient's upcoming breast MRI.

## 2019-09-04 ENCOUNTER — HOSPITAL ENCOUNTER (OUTPATIENT)
Dept: MRI IMAGING | Age: 59
Discharge: HOME | End: 2019-09-04
Attending: SURGERY
Payer: COMMERCIAL

## 2019-09-04 DIAGNOSIS — Z86.000 HISTORY OF DUCTAL CARCINOMA IN SITU (DCIS) OF BREAST: ICD-10-CM

## 2019-09-04 DIAGNOSIS — Z91.89 AT HIGH RISK FOR BREAST CANCER: ICD-10-CM

## 2019-09-04 LAB — CREAT BLD-MCNC: 0.9 MG/DL (ref 0.6–1.3)

## 2019-09-04 PROCEDURE — 77049 MRI BREAST C-+ W/CAD BI: CPT

## 2019-09-04 PROCEDURE — 82565 ASSAY OF CREATININE: CPT

## 2019-09-04 PROCEDURE — 74011250636 HC RX REV CODE- 250/636: Performed by: SURGERY

## 2019-09-04 PROCEDURE — A9585 GADOBUTROL INJECTION: HCPCS | Performed by: SURGERY

## 2019-09-04 RX ADMIN — GADOBUTROL 15 ML: 604.72 INJECTION INTRAVENOUS at 15:48

## 2019-09-17 ENCOUNTER — TELEPHONE (OUTPATIENT)
Dept: SURGERY | Age: 59
End: 2019-09-17

## 2019-09-17 DIAGNOSIS — R92.8 ABNORMAL FINDING ON BREAST IMAGING: Primary | ICD-10-CM

## 2019-09-17 NOTE — TELEPHONE ENCOUNTER
Called with breast MRI results. Biopsy x 2 recommended to right breast.  She is comfortable having this. Would like to have done ASAP as they are planning to travel to [place] on 10/18. Discussed that it is likely that surgery could be delayed until after their trip. She will await a phone call regarding scheduling MRI biopsies    Please schedule RIGHT MRI biopsy x 2.

## 2019-09-20 NOTE — TELEPHONE ENCOUNTER
MRI biopsy scheduled for:    9/26/2019 0900  - 30 min Shield, Sheryle Fetters, MD [facility] Rad Mammo [place]

## 2019-09-26 ENCOUNTER — HOSPITAL ENCOUNTER (OUTPATIENT)
Dept: MRI IMAGING | Age: 59
Discharge: HOME | End: 2019-09-26
Attending: NURSE PRACTITIONER
Payer: COMMERCIAL

## 2019-09-26 ENCOUNTER — HOSPITAL ENCOUNTER (OUTPATIENT)
Dept: MAMMOGRAPHY | Age: 59
Discharge: HOME | End: 2019-09-26
Attending: RADIOLOGY
Payer: COMMERCIAL

## 2019-09-26 DIAGNOSIS — R92.8 ABNORMAL FINDING ON BREAST IMAGING: ICD-10-CM

## 2019-09-26 PROCEDURE — 19085 BX BREAST 1ST LESION MR IMAG: CPT

## 2019-09-26 PROCEDURE — A9585 GADOBUTROL INJECTION: HCPCS | Performed by: NURSE PRACTITIONER

## 2019-09-26 PROCEDURE — 74011000250 HC RX REV CODE- 250: Performed by: NURSE PRACTITIONER

## 2019-09-26 PROCEDURE — 77065 DX MAMMO INCL CAD UNI: CPT

## 2019-09-26 PROCEDURE — 74011000258 HC RX REV CODE- 258: Performed by: NURSE PRACTITIONER

## 2019-09-26 PROCEDURE — 19086 BX BREAST ADD LESION MR IMAG: CPT

## 2019-09-26 PROCEDURE — 88305 TISSUE EXAM BY PATHOLOGIST: CPT

## 2019-09-26 PROCEDURE — 74011250636 HC RX REV CODE- 250/636: Performed by: NURSE PRACTITIONER

## 2019-09-26 RX ORDER — SODIUM BICARBONATE 84 MG/ML
10 INJECTION, SOLUTION INTRAVENOUS
Status: COMPLETED | OUTPATIENT
Start: 2019-09-26 | End: 2019-09-26

## 2019-09-26 RX ORDER — SODIUM CHLORIDE 0.9 % (FLUSH) 0.9 %
10 SYRINGE (ML) INJECTION
Status: COMPLETED | OUTPATIENT
Start: 2019-09-26 | End: 2019-09-26

## 2019-09-26 RX ORDER — LIDOCAINE HYDROCHLORIDE AND EPINEPHRINE 10; 10 MG/ML; UG/ML
24 INJECTION, SOLUTION INFILTRATION; PERINEURAL ONCE
Status: COMPLETED | OUTPATIENT
Start: 2019-09-26 | End: 2019-09-26

## 2019-09-26 RX ORDER — LIDOCAINE HYDROCHLORIDE 10 MG/ML
16 INJECTION INFILTRATION; PERINEURAL ONCE
Status: COMPLETED | OUTPATIENT
Start: 2019-09-26 | End: 2019-09-26

## 2019-09-26 RX ADMIN — LIDOCAINE HYDROCHLORIDE AND EPINEPHRINE 240 MG: 10; 10 INJECTION, SOLUTION INFILTRATION; PERINEURAL at 08:05

## 2019-09-26 RX ADMIN — LIDOCAINE HYDROCHLORIDE 24 ML: 10 INJECTION, SOLUTION INFILTRATION; PERINEURAL at 08:05

## 2019-09-26 RX ADMIN — SODIUM BICARBONATE 10 MEQ: 84 INJECTION, SOLUTION INTRAVENOUS at 08:05

## 2019-09-26 RX ADMIN — SODIUM CHLORIDE 100 ML: 900 INJECTION, SOLUTION INTRAVENOUS at 09:00

## 2019-09-26 RX ADMIN — Medication 10 ML: at 09:00

## 2019-09-26 RX ADMIN — GADOBUTROL 13 ML: 604.72 INJECTION INTRAVENOUS at 09:00

## 2019-09-26 NOTE — PROGRESS NOTES
Patient tolerated right breast biopsy well with minimal bleeding. Biopsy site was bandaged using steri strips, gauze and tegaderm, ice pack placed on site. Discharge instructions were reviewed with the patient who expresses understanding. She was provided with a written copy as well. Advised patient that results should be available by Monday, and that she will receive a phone call with these results. Encouraged her to call with any questions or concerns.

## 2019-10-01 ENCOUNTER — TELEPHONE (OUTPATIENT)
Dept: SURGERY | Age: 59
End: 2019-10-01

## 2019-10-01 NOTE — TELEPHONE ENCOUNTER
Spoke with patient regarding MRI biopsy results. Aware that both areas can be excised at time of surgery. Would like to go ahead and proceed with surgery. May be on vacation mid October and assured her it was ok to wait until after she travels to have the surgery. MRI biopsy x 2 - 9/26/19 -   FINAL PATHOLOGIC DIAGNOSIS   1. Breast, right site A, core biopsy:   Benign breast tissue with focal fibrocystic changes   No atypia, in situ or invasive carcinoma identified   2. Breast, right site B, core biopsy:   Intraductal papilloma with atypical ductal hyperplasia and associated microcalcifications   Separate foci of atypical ductal hyperplasia, usual ductal hyperplasia, columnar cell change, apocrine metaplasia, and microcyst formation     Stereotactic biopsy 8/12/19.   PATH: Papilloma with calcifications, ADH, fibrocystic changes.

## 2019-10-03 ENCOUNTER — TELEPHONE (OUTPATIENT)
Dept: MRI IMAGING | Age: 59
End: 2019-10-03

## 2019-10-09 ENCOUNTER — TELEPHONE (OUTPATIENT)
Dept: SURGERY | Age: 59
End: 2019-10-09

## 2019-10-09 ENCOUNTER — DOCUMENTATION ONLY (OUTPATIENT)
Dept: SURGERY | Age: 59
End: 2019-10-09

## 2019-10-09 DIAGNOSIS — C50.912 MALIGNANT NEOPLASM OF LEFT FEMALE BREAST, UNSPECIFIED ESTROGEN RECEPTOR STATUS, UNSPECIFIED SITE OF BREAST (HCC): Primary | ICD-10-CM

## 2019-10-09 NOTE — LETTER
NOTIFICATION OF RETURN TO WORK 
 
10/9/2019 12:04 PM 
 
Ms. Leyla Davies  [address] [facility] [id number] Kayli Rebollar To Whom It May Concern: 
 
Leyla Davies is under the care of [address] [place] [address] . She is having surgery on October 15, 2019. She can return to regular duty on October 21, 2019. If there are questions or concerns, please have the patient contact our office.  
 
Sincerely, 
 
 
 
 
Crystal Perez MD

## 2019-10-09 NOTE — PROGRESS NOTES
Patient still has a hard area at her incision site, some moderate pain. Recommended ice, warm compress and/or Tylenol as needed. I explained what a hematoma is and the natural course of this. She felt very reassured.

## 2019-10-09 NOTE — LETTER
NOTIFICATION OF RETURN TO WORK  
 
10/9/2019 12:00 PM 
 
Ms. Trinity Murphy [address] [phone] [id number] Beulah Changly To Whom It May Concern: 
 
Trinity Murphy is under the care of [address] [place] [address]. She will be undergoing surgery on October 15, 2019. She should not travel out of the area for at least ten days. If there are questions or concerns, please have the patient contact our office.  
 
Sincerely, 
 
 
 
 
Alisson Klein MD

## 2019-10-09 NOTE — TELEPHONE ENCOUNTER
Returned patient's call. She is having surgery on 10/15/19. Is supposed to travel for work on 10/16/19. In order not to travel, she needs a note for work stating that she cannot travel. Also would like a note stating that she should be out of work until 10/21. She will return sooner if she feels like she can.     I wrote both of the notes above and sent them SAFEMAIL to [email or web address].

## 2019-10-09 NOTE — LETTER
NOTIFICATION OF RETURN TO WORK / SCHOOL 
 
10/9/2019 12:09 PM 
 
Ms. Governor Sheldon  [phone] [place] Ask To Whom It May Concern: 
 
Governor Sheldon is under the care of [address] [place] [address]. She will be undergoing surgery on October 15, 2019. She should not travel out of the area for at least ten days post-op. If there are questions or concerns please have the patient contact our office.  
 
Sincerely, 
 
 
 
 
 
Daren Dias MD

## 2019-10-16 DIAGNOSIS — N60.91 BENIGN MAMMARY DYSPLASIA OF RIGHT BREAST: Primary | ICD-10-CM

## 2019-10-16 NOTE — PROGRESS NOTES
Appears that case was rescheduled. When case was cancelled, her PAT appt was deleted. Left VM at this time requesting she return call.

## 2019-10-16 NOTE — PROGRESS NOTES
Pt to see Hayden Gross @ [facility] on 10/21/19. Will request OVN & SHYANNEG on 10/23/19.

## 2019-10-24 ENCOUNTER — HOSPITAL ENCOUNTER (OUTPATIENT)
Dept: PREADMISSION TESTING | Age: 59
Discharge: HOME | End: 2019-10-24
Payer: COMMERCIAL

## 2019-10-24 VITALS
WEIGHT: 284 LBS | HEIGHT: 67 IN | SYSTOLIC BLOOD PRESSURE: 134 MMHG | TEMPERATURE: 97.7 F | RESPIRATION RATE: 20 BRPM | DIASTOLIC BLOOD PRESSURE: 73 MMHG | HEART RATE: 91 BPM | BODY MASS INDEX: 44.57 KG/M2 | OXYGEN SATURATION: 100 %

## 2019-10-24 DIAGNOSIS — N60.91 BENIGN MAMMARY DYSPLASIA OF RIGHT BREAST: ICD-10-CM

## 2019-10-24 LAB
ALBUMIN SERPL-MCNC: 3.6 G/DL (ref 3.5–5)
ALBUMIN/GLOB SERPL: 0.9 {RATIO} (ref 1.1–2.2)
ALP SERPL-CCNC: 54 U/L (ref 45–117)
ALT SERPL-CCNC: 18 U/L (ref 12–78)
ANION GAP SERPL CALC-SCNC: 8 MMOL/L (ref 5–15)
AST SERPL-CCNC: 13 U/L (ref 15–37)
BILIRUB SERPL-MCNC: 0.5 MG/DL (ref 0.2–1)
BUN SERPL-MCNC: 17 MG/DL (ref 6–20)
BUN/CREAT SERPL: 16 (ref 12–20)
CALCIUM SERPL-MCNC: 9.9 MG/DL (ref 8.5–10.1)
CHLORIDE SERPL-SCNC: 103 MMOL/L (ref 97–108)
CO2 SERPL-SCNC: 25 MMOL/L (ref 21–32)
CREAT SERPL-MCNC: 1.07 MG/DL (ref 0.55–1.02)
GLOBULIN SER CALC-MCNC: 3.9 G/DL (ref 2–4)
GLUCOSE SERPL-MCNC: 104 MG/DL (ref 65–100)
POTASSIUM SERPL-SCNC: 4.3 MMOL/L (ref 3.5–5.1)
PROT SERPL-MCNC: 7.5 G/DL (ref 6.4–8.2)
SODIUM SERPL-SCNC: 136 MMOL/L (ref 136–145)

## 2019-10-24 PROCEDURE — 36415 COLL VENOUS BLD VENIPUNCTURE: CPT

## 2019-10-24 PROCEDURE — 80053 COMPREHEN METABOLIC PANEL: CPT

## 2019-10-24 RX ORDER — ASPIRIN 81 MG/1
81 TABLET ORAL DAILY
COMMUNITY

## 2019-10-24 RX ORDER — LISINOPRIL AND HYDROCHLOROTHIAZIDE 20; 25 MG/1; MG/1
1 TABLET ORAL DAILY
COMMUNITY

## 2019-10-24 NOTE — PROGRESS NOTES
Called kimberly Perdomo, office for last OVN/EKG, but the office doesn't open until 0830. Will call back.

## 2019-10-24 NOTE — PERIOP NOTES
PAT: Diabetic Protocol reviewed with patient who voiced understanding of need to hold Metformin DOS and use of Glucose Tabs prn hypoglycemia symptoms.

## 2019-10-24 NOTE — PERIOP NOTES
[address] [address] [facility]   MAIN OR                                  [phone]   MAIN PRE OP                          [phone]                                                                                AMBULATORY PRE OP          [phone]  PRE-ADMISSION TESTING    [phone]   Surgery Date:   Tuesday, Oct. 29th        Is surgery arrival time given by surgeon? YES  If NO, [address] staff will call you between 3 and 7pm the day before your surgery with your arrival time. (If your surgery is on a Monday, we will call you the Friday before.)    Call [phone] after 7pm Monday-Friday if you did not receive this call. INSTRUCTIONS BEFORE YOUR SURGERY   When You  Arrive Arrive at the [address]nd [address] [facility] on the day of your surgery  Have your insurance card, photo ID, and any copayment (if needed)   Food   and   Drink NO food or drink after midnight the night before surgery    This means NO water, gum, mints, coffee, juice, etc.  No alcohol (beer, wine, liquor) 24 hours before and after surgery   Medications to   TAKE   Morning of Surgery MEDICATIONS TO TAKE THE MORNING OF SURGERY WITH A SIP OF WATER:    Tylenol as needed, Xanax as needed, Tramadol as needed   Medications  To  STOP      7 days before surgery  Non-Steroidal anti-inflammatory Drugs (NSAID's): for example, Ibuprofen (Advil, Motrin), Naproxen (Aleve)   Aspirin, if taking for pain    Herbal supplements, vitamins, and fish oil   Other:  (Pain medications not listed above, including Tylenol may be taken)   Blood  Thinners  If you take  Aspirin, Plavix, Coumadin, or any blood-thinning or anti-blood clot medicine, talk to the doctor who prescribed the medications for pre-operative instructions.    Bathing Clothing  Jewelry  Valuables      If you shower the morning of surgery, please do not apply anything to your skin (lotions, powders, deodorant, or makeup, especially mascara)   Follow Chlorhexidine Care Fusion body wash instructions provided to you during PAT appointment. Begin 3 days prior to surgery.  Do not shave or trim anywhere 24 hours before surgery   Wear your hair loose or down; no pony-tails, buns, or metal hair clips   Wear loose, comfortable, clean clothes   Wear glasses instead of contacts   Leave money, valuables, and jewelry, including body piercings, at home   Going Home - or Spending the Night  SAME-DAY SURGERY: You must have a responsible adult drive you home and stay with you 24 hours after surgery   . Special Instructions Free  Parking     Follow all instructions so your surgery wont be cancelled. Please, be on time. If a situation occurs and you are delayed the day of surgery, call [phone] or [phone]. If your physical condition changes (like a fever, cold, flu, etc.) call your surgeon. Home medication(s) reviewed and verified with patient during PAT appointment. The patient was contacted  in person. The patient verbalizes understanding of all instructions and does not  need reinforcement.

## 2019-10-25 NOTE — PERIOP NOTES
Call placed to Dr. Jesus Quinones office, stress test results are not yet available, PAT to return call on Monday for clearance.

## 2019-10-28 ENCOUNTER — TELEPHONE (OUTPATIENT)
Dept: SURGERY | Age: 59
End: 2019-10-28

## 2019-10-28 ENCOUNTER — ANESTHESIA EVENT (OUTPATIENT)
Dept: SURGERY | Age: 59
End: 2019-10-28
Payer: COMMERCIAL

## 2019-10-28 NOTE — TELEPHONE ENCOUNTER
Called patient back to follow up with her regarding the conversation we were having earlier. I notified Dr. Aline Brady that she came down with a sinus infection and has been on abx, is already feeling better. Per Dr. Aline Brady, okay to proceed with surgery tomorrow.

## 2019-10-28 NOTE — TELEPHONE ENCOUNTER
Dr. Vandana cShroeder,  Patient went to [facility] on Saturday for stuffiness, headache, sinus infection. Was started on Augmentin once every 12 hrs x 10 days. Already feeling better but wanted to notify you of this since she is having surgery tomorrow. Is it okay for her to be on antibiotics?   Thanks,  Rosalind López

## 2019-10-29 ENCOUNTER — HOSPITAL ENCOUNTER (OUTPATIENT)
Age: 59
Setting detail: OUTPATIENT SURGERY
Discharge: HOME | End: 2019-10-29
Attending: SURGERY | Admitting: SURGERY
Payer: COMMERCIAL

## 2019-10-29 ENCOUNTER — APPOINTMENT (OUTPATIENT)
Dept: MAMMOGRAPHY | Age: 59
End: 2019-10-29
Attending: SURGERY
Payer: COMMERCIAL

## 2019-10-29 ENCOUNTER — HOSPITAL ENCOUNTER (OUTPATIENT)
Dept: GENERAL RADIOLOGY | Age: 59
Setting detail: OUTPATIENT SURGERY
Discharge: HOME | End: 2019-10-29
Attending: SURGERY | Admitting: SURGERY
Payer: COMMERCIAL

## 2019-10-29 ENCOUNTER — ANESTHESIA (OUTPATIENT)
Dept: SURGERY | Age: 59
End: 2019-10-29
Payer: COMMERCIAL

## 2019-10-29 VITALS
OXYGEN SATURATION: 100 % | DIASTOLIC BLOOD PRESSURE: 53 MMHG | TEMPERATURE: 98.4 F | HEART RATE: 79 BPM | SYSTOLIC BLOOD PRESSURE: 115 MMHG | RESPIRATION RATE: 19 BRPM

## 2019-10-29 DIAGNOSIS — Z91.89 AT HIGH RISK FOR BREAST CANCER: Primary | ICD-10-CM

## 2019-10-29 DIAGNOSIS — C50.919 BREAST CANCER (HCC): ICD-10-CM

## 2019-10-29 LAB
GLUCOSE BLD STRIP.AUTO-MCNC: 104 MG/DL (ref 65–100)
GLUCOSE BLD STRIP.AUTO-MCNC: 98 MG/DL (ref 65–100)
SERVICE CMNT-IMP: ABNORMAL
SERVICE CMNT-IMP: NORMAL

## 2019-10-29 PROCEDURE — 74011250636 HC RX REV CODE- 250/636: Performed by: SURGERY

## 2019-10-29 PROCEDURE — 74011000250 HC RX REV CODE- 250

## 2019-10-29 PROCEDURE — 77030026438 HC STYL ET INTUB CARD -A: Performed by: NURSE ANESTHETIST, CERTIFIED REGISTERED

## 2019-10-29 PROCEDURE — 74011000250 HC RX REV CODE- 250: Performed by: NURSE ANESTHETIST, CERTIFIED REGISTERED

## 2019-10-29 PROCEDURE — 77030031139 HC SUT VCRL2 J&J -A: Performed by: SURGERY

## 2019-10-29 PROCEDURE — 76210000040 HC AMBSU PH I REC FIRST 0.5 HR: Performed by: SURGERY

## 2019-10-29 PROCEDURE — 88307 TISSUE EXAM BY PATHOLOGIST: CPT

## 2019-10-29 PROCEDURE — 77030011267 HC ELECTRD BLD COVD -A: Performed by: SURGERY

## 2019-10-29 PROCEDURE — 74011000250 HC RX REV CODE- 250: Performed by: SURGERY

## 2019-10-29 PROCEDURE — 77030018836 HC SOL IRR NACL ICUM -A: Performed by: SURGERY

## 2019-10-29 PROCEDURE — 76210000050 HC AMBSU PH II REC 0.5 TO 1 HR: Performed by: SURGERY

## 2019-10-29 PROCEDURE — 74011250636 HC RX REV CODE- 250/636: Performed by: ANESTHESIOLOGY

## 2019-10-29 PROCEDURE — 77030002996 HC SUT SLK J&J -A: Performed by: SURGERY

## 2019-10-29 PROCEDURE — 76030000001 HC AMB SURG OR TIME 1 TO 1.5: Performed by: SURGERY

## 2019-10-29 PROCEDURE — 77030040361 HC SLV COMPR DVT MDII -B

## 2019-10-29 PROCEDURE — 77030010507 HC ADH SKN DERMBND J&J -B: Performed by: SURGERY

## 2019-10-29 PROCEDURE — 77030003460 HC NDL BIOP BRST COOK -B

## 2019-10-29 PROCEDURE — 19281 PERQ DEVICE BREAST 1ST IMAG: CPT

## 2019-10-29 PROCEDURE — 76060000062 HC AMB SURG ANES 1 TO 1.5 HR: Performed by: SURGERY

## 2019-10-29 PROCEDURE — 77030002933 HC SUT MCRYL J&J -A: Performed by: SURGERY

## 2019-10-29 PROCEDURE — 19282 PERQ DEVICE BREAST EA IMAG: CPT

## 2019-10-29 PROCEDURE — 74011250636 HC RX REV CODE- 250/636: Performed by: NURSE ANESTHETIST, CERTIFIED REGISTERED

## 2019-10-29 PROCEDURE — 82962 GLUCOSE BLOOD TEST: CPT

## 2019-10-29 PROCEDURE — 77030008684 HC TU ET CUF COVD -B: Performed by: NURSE ANESTHETIST, CERTIFIED REGISTERED

## 2019-10-29 RX ORDER — LIDOCAINE HYDROCHLORIDE 20 MG/ML
INJECTION, SOLUTION EPIDURAL; INFILTRATION; INTRACAUDAL; PERINEURAL AS NEEDED
Status: DISCONTINUED | OUTPATIENT
Start: 2019-10-29 | End: 2019-10-29 | Stop reason: HOSPADM

## 2019-10-29 RX ORDER — LIDOCAINE HYDROCHLORIDE 10 MG/ML
0.1 INJECTION, SOLUTION EPIDURAL; INFILTRATION; INTRACAUDAL; PERINEURAL AS NEEDED
Status: DISCONTINUED | OUTPATIENT
Start: 2019-10-29 | End: 2019-10-29 | Stop reason: HOSPADM

## 2019-10-29 RX ORDER — BUPIVACAINE HYDROCHLORIDE AND EPINEPHRINE 5; 5 MG/ML; UG/ML
30 INJECTION, SOLUTION EPIDURAL; INTRACAUDAL; PERINEURAL ONCE
Status: DISCONTINUED | OUTPATIENT
Start: 2019-10-29 | End: 2019-10-29 | Stop reason: HOSPADM

## 2019-10-29 RX ORDER — LIDOCAINE HYDROCHLORIDE AND EPINEPHRINE 10; 10 MG/ML; UG/ML
INJECTION, SOLUTION INFILTRATION; PERINEURAL AS NEEDED
Status: DISCONTINUED | OUTPATIENT
Start: 2019-10-29 | End: 2019-10-29 | Stop reason: HOSPADM

## 2019-10-29 RX ORDER — BUPIVACAINE HYDROCHLORIDE AND EPINEPHRINE 5; 5 MG/ML; UG/ML
INJECTION, SOLUTION EPIDURAL; INTRACAUDAL; PERINEURAL AS NEEDED
Status: DISCONTINUED | OUTPATIENT
Start: 2019-10-29 | End: 2019-10-29 | Stop reason: HOSPADM

## 2019-10-29 RX ORDER — GLYCOPYRROLATE 0.2 MG/ML
INJECTION INTRAMUSCULAR; INTRAVENOUS AS NEEDED
Status: DISCONTINUED | OUTPATIENT
Start: 2019-10-29 | End: 2019-10-29 | Stop reason: HOSPADM

## 2019-10-29 RX ORDER — MIDAZOLAM HYDROCHLORIDE 1 MG/ML
INJECTION, SOLUTION INTRAMUSCULAR; INTRAVENOUS AS NEEDED
Status: DISCONTINUED | OUTPATIENT
Start: 2019-10-29 | End: 2019-10-29 | Stop reason: HOSPADM

## 2019-10-29 RX ORDER — ROCURONIUM BROMIDE 10 MG/ML
INJECTION, SOLUTION INTRAVENOUS AS NEEDED
Status: DISCONTINUED | OUTPATIENT
Start: 2019-10-29 | End: 2019-10-29 | Stop reason: HOSPADM

## 2019-10-29 RX ORDER — LIDOCAINE HYDROCHLORIDE AND EPINEPHRINE 10; 10 MG/ML; UG/ML
30 INJECTION, SOLUTION INFILTRATION; PERINEURAL ONCE
Status: DISCONTINUED | OUTPATIENT
Start: 2019-10-29 | End: 2019-10-29 | Stop reason: HOSPADM

## 2019-10-29 RX ORDER — HYDROMORPHONE HYDROCHLORIDE 1 MG/ML
.5-1 INJECTION, SOLUTION INTRAMUSCULAR; INTRAVENOUS; SUBCUTANEOUS
Status: DISCONTINUED | OUTPATIENT
Start: 2019-10-29 | End: 2019-10-29 | Stop reason: HOSPADM

## 2019-10-29 RX ORDER — LIDOCAINE HYDROCHLORIDE 20 MG/ML
INJECTION, SOLUTION EPIDURAL; INFILTRATION; INTRACAUDAL; PERINEURAL
Status: COMPLETED
Start: 2019-10-29 | End: 2019-10-29

## 2019-10-29 RX ORDER — FENTANYL CITRATE 50 UG/ML
INJECTION, SOLUTION INTRAMUSCULAR; INTRAVENOUS AS NEEDED
Status: DISCONTINUED | OUTPATIENT
Start: 2019-10-29 | End: 2019-10-29 | Stop reason: HOSPADM

## 2019-10-29 RX ORDER — HYDROCODONE BITARTRATE AND ACETAMINOPHEN 7.5; 325 MG/1; MG/1
1 TABLET ORAL
Qty: 20 TAB | Refills: 0 | Status: SHIPPED | OUTPATIENT
Start: 2019-10-29 | End: 2019-11-05

## 2019-10-29 RX ORDER — NEOSTIGMINE METHYLSULFATE 1 MG/ML
INJECTION INTRAVENOUS AS NEEDED
Status: DISCONTINUED | OUTPATIENT
Start: 2019-10-29 | End: 2019-10-29 | Stop reason: HOSPADM

## 2019-10-29 RX ORDER — SUCCINYLCHOLINE CHLORIDE 20 MG/ML
INJECTION INTRAMUSCULAR; INTRAVENOUS AS NEEDED
Status: DISCONTINUED | OUTPATIENT
Start: 2019-10-29 | End: 2019-10-29 | Stop reason: HOSPADM

## 2019-10-29 RX ORDER — ONDANSETRON 2 MG/ML
4 INJECTION INTRAMUSCULAR; INTRAVENOUS AS NEEDED
Status: DISCONTINUED | OUTPATIENT
Start: 2019-10-29 | End: 2019-10-29 | Stop reason: HOSPADM

## 2019-10-29 RX ORDER — PROPOFOL 10 MG/ML
INJECTION, EMULSION INTRAVENOUS AS NEEDED
Status: DISCONTINUED | OUTPATIENT
Start: 2019-10-29 | End: 2019-10-29 | Stop reason: HOSPADM

## 2019-10-29 RX ORDER — SODIUM CHLORIDE, SODIUM LACTATE, POTASSIUM CHLORIDE, CALCIUM CHLORIDE 600; 310; 30; 20 MG/100ML; MG/100ML; MG/100ML; MG/100ML
125 INJECTION, SOLUTION INTRAVENOUS CONTINUOUS
Status: DISCONTINUED | OUTPATIENT
Start: 2019-10-29 | End: 2019-10-29 | Stop reason: HOSPADM

## 2019-10-29 RX ORDER — LIDOCAINE HYDROCHLORIDE 10 MG/ML
4 INJECTION, SOLUTION EPIDURAL; INFILTRATION; INTRACAUDAL; PERINEURAL
Status: DISCONTINUED | OUTPATIENT
Start: 2019-10-29 | End: 2019-10-29 | Stop reason: HOSPADM

## 2019-10-29 RX ORDER — ESMOLOL HYDROCHLORIDE 10 MG/ML
INJECTION INTRAVENOUS AS NEEDED
Status: DISCONTINUED | OUTPATIENT
Start: 2019-10-29 | End: 2019-10-29 | Stop reason: HOSPADM

## 2019-10-29 RX ORDER — ONDANSETRON 2 MG/ML
INJECTION INTRAMUSCULAR; INTRAVENOUS AS NEEDED
Status: DISCONTINUED | OUTPATIENT
Start: 2019-10-29 | End: 2019-10-29 | Stop reason: HOSPADM

## 2019-10-29 RX ADMIN — LIDOCAINE HYDROCHLORIDE 40 MG: 20 INJECTION, SOLUTION EPIDURAL; INFILTRATION; INTRACAUDAL; PERINEURAL at 15:21

## 2019-10-29 RX ADMIN — FENTANYL CITRATE 50 MCG: 50 INJECTION, SOLUTION INTRAMUSCULAR; INTRAVENOUS at 16:24

## 2019-10-29 RX ADMIN — FENTANYL CITRATE 50 MCG: 50 INJECTION, SOLUTION INTRAMUSCULAR; INTRAVENOUS at 15:39

## 2019-10-29 RX ADMIN — ROCURONIUM BROMIDE 10 MG: 50 INJECTION, SOLUTION INTRAVENOUS at 15:40

## 2019-10-29 RX ADMIN — ROCURONIUM BROMIDE 10 MG: 50 INJECTION, SOLUTION INTRAVENOUS at 15:21

## 2019-10-29 RX ADMIN — SODIUM CHLORIDE, SODIUM LACTATE, POTASSIUM CHLORIDE, AND CALCIUM CHLORIDE 125 ML/HR: 600; 310; 30; 20 INJECTION, SOLUTION INTRAVENOUS at 15:07

## 2019-10-29 RX ADMIN — ROCURONIUM BROMIDE 20 MG: 50 INJECTION, SOLUTION INTRAVENOUS at 15:35

## 2019-10-29 RX ADMIN — CEFAZOLIN 3 G: 1 INJECTION, POWDER, FOR SOLUTION INTRAMUSCULAR; INTRAVENOUS; PARENTERAL at 15:32

## 2019-10-29 RX ADMIN — NEOSTIGMINE METHYLSULFATE 3 MG: 1 INJECTION, SOLUTION INTRAVENOUS at 16:03

## 2019-10-29 RX ADMIN — MIDAZOLAM 2 MG: 1 INJECTION INTRAMUSCULAR; INTRAVENOUS at 15:14

## 2019-10-29 RX ADMIN — LIDOCAINE HYDROCHLORIDE 5 ML: 20 INJECTION, SOLUTION EPIDURAL; INFILTRATION; INTRACAUDAL; PERINEURAL at 14:33

## 2019-10-29 RX ADMIN — FENTANYL CITRATE 50 MCG: 50 INJECTION, SOLUTION INTRAMUSCULAR; INTRAVENOUS at 15:18

## 2019-10-29 RX ADMIN — PROPOFOL 200 MG: 10 INJECTION, EMULSION INTRAVENOUS at 15:21

## 2019-10-29 RX ADMIN — GLYCOPYRROLATE 0.4 MG: 0.2 INJECTION, SOLUTION INTRAMUSCULAR; INTRAVENOUS at 16:03

## 2019-10-29 RX ADMIN — FENTANYL CITRATE 100 MCG: 50 INJECTION, SOLUTION INTRAMUSCULAR; INTRAVENOUS at 15:21

## 2019-10-29 RX ADMIN — GLYCOPYRROLATE 0.2 MG: 0.2 INJECTION, SOLUTION INTRAMUSCULAR; INTRAVENOUS at 16:15

## 2019-10-29 RX ADMIN — NEOSTIGMINE METHYLSULFATE 1 MG: 1 INJECTION, SOLUTION INTRAVENOUS at 16:15

## 2019-10-29 RX ADMIN — ONDANSETRON 4 MG: 2 INJECTION INTRAMUSCULAR; INTRAVENOUS at 16:03

## 2019-10-29 RX ADMIN — SUCCINYLCHOLINE CHLORIDE 100 MG: 20 INJECTION, SOLUTION INTRAMUSCULAR; INTRAVENOUS; PARENTERAL at 15:21

## 2019-10-29 RX ADMIN — LIDOCAINE HYDROCHLORIDE 5 ML: 20 INJECTION, SOLUTION EPIDURAL; INFILTRATION; INTRACAUDAL; PERINEURAL at 13:00

## 2019-10-29 RX ADMIN — ESMOLOL HYDROCHLORIDE 20 MG: 10 INJECTION INTRAVENOUS at 16:09

## 2019-10-29 NOTE — BRIEF OP NOTE
BRIEF OPERATIVE NOTE    Date of Procedure: 10/29/2019   Preoperative Diagnosis: RIGHT BREAST DUCTAL HYPERPLASIA  Postoperative Diagnosis: RIGHT BREAST DUCTAL HYPERPLASIA    Procedure(s):  RIGHT BREAST EXCISIONAL BIOPSY WITH BRACKETED NEEDLE LOCALIZATION  Surgeon(s) and Role:     * Yani De Leon MD - Primary         Surgical Assistant:  [facility]    Surgical Staff:  Circ-1: Jennifer Romero, RN  Scrub RN-1: Liudmila Gagnon RN  Surg Asst-1: Laurence VASQUEZ  Event Time In Time Out   Incision Start 1540    Incision Close       Anesthesia: General   Estimated Blood Loss: minimal  Specimens:   ID Type Source Tests Collected by Time Destination   1 : right breast retro areolar  Preservative Breast  Yani De Leon MD 10/29/2019 1543 Pathology   2 : right breast lower outer biopsy Preservative Breast  Yani De Leon MD 10/29/2019 1544 Pathology   3 : right breast inferior deep lateral margin Preservative Breast  Yani De Leon MD 10/29/2019 1558 Pathology      Findings: path discharge therefore ductal excision performed, bx with loc x 2 one clip seen on flouro the other directly visualized   Complications: none  Implants: * No implants in log *

## 2019-10-29 NOTE — ANESTHESIA POSTPROCEDURE EVALUATION
Procedure(s):  RIGHT BREAST EXCISIONAL BIOPSY WITH BRACKETED NEEDLE LOCALIZATION. general    Anesthesia Post Evaluation        Patient location during evaluation: PACU  Level of consciousness: awake  Pain management: adequate  Airway patency: patent  Anesthetic complications: no  Cardiovascular status: acceptable  Respiratory status: acceptable  Hydration status: acceptable  Post anesthesia nausea and vomiting:  none      Vitals Value Taken Time   /47 10/29/2019  4:30 PM   Temp 36.9 °C (98.4 °F) 10/29/2019  4:27 PM   Pulse 91 10/29/2019  4:32 PM   Resp 14 10/29/2019  4:32 PM   SpO2 100 % 10/29/2019  4:32 PM   Vitals shown include unvalidated device data.

## 2019-10-29 NOTE — DISCHARGE INSTRUCTIONS
Discharge Instructions from Dr. Lenin Apodaca    · I will call you with the pathology results, typically within 1 week from today. · You may shower, but no hot tubs, swimming pools, or baths until your incision is healed. · No heavy lifting with the affected extremity (nothing greater than 5 pounds), and limit its use for the next 4-5 days. · You may use an ice pack for comfort for the next couple of days, but do not place ice directly on the skin. Rather, use a towel or clothing to serve as a barrier between skin and ice to prevent injury. · If I placed a drain, follow the drain instructions provided, especially as you keep a record of the drain output. · Follow medication instructions carefully. · Watch for signs of infection as listed below. · Redness  · Swelling  · Drainage from the incision or from your nipple that appears infected  · Fever over 101 degrees for consecutive readings, or over 99.5 if you are currently undergoing chemotherapy. · Call our office (number is below) for a follow-up appointment. · If you have any problems, our phone number is [phone]. DISCHARGE SUMMARY from your Nurse    The following personal items collected during your admission are returned to you:   Dental Appliance: Dental Appliances: None  Vision: Visual Aid: Glasses  Hearing Aid:    Jewelry: Jewelry: None  Clothing: Clothing: Footwear, Pants, Undergarments, Shirt  Other Valuables: Other Valuables: Eyeglasses  Valuables sent to safe: Personal Items Sent to Safe: N/A    PATIENT INSTRUCTIONS:    After general anesthesia or intravenous sedation, for 24 hours or while taking prescription Narcotics:  · Limit your activities  · Do not drive and operate hazardous machinery  · Do not make important personal or business decisions  · Do  not drink alcoholic beverages  · If you have not urinated within 8 hours after discharge, please contact your surgeon on call.     Report the following to your surgeon:  · Excessive pain, swelling, redness or odor of or around the surgical area  · Temperature over 100.5  · Nausea and vomiting lasting longer than 4 hours or if unable to take medications  · Any signs of decreased circulation or nerve impairment to extremity: change in color, persistent  numbness, tingling, coldness or increase pain  · Any questions    COUGH AND DEEP BREATHE    Breathing deep and coughing are very important exercises to do after surgery. Deep breathing and coughing open the little air tubes and air sacks in your lungs. You take deep breaths every day. You may not even notice - it is just something you do when you sigh or yawn. It is a natural exercise you do to keep these air passages open. After surgery, take deep breaths and cough, on purpose. Coughing and deep breathing help prevent bronchitis and pneumonia after surgery. If you had chest or belly surgery, use a pillow as a \"hug allyson\" and hold it tightly to your chest or belly when you cough. DIRECTIONS:  6. Take 10 to 15 slow deep breaths every hour while awake. 7. Breathe in deeply, and hold it for 2 seconds. 8. Exhale slowly through puckered lips, like blowing up a balloon. 9. After every 4th or 5th deep breath, hug your pillow to your chest or belly and give a hard, deep cough. Yes, it will probably hurt. But doing this exercise is very important part of healing after surgery. Take your pain medicine to help you do this exercise without too much pain. IF YOU HAVE BEEN DIAGNOSED WITH SLEEP APNEA, PLEASE USE YOUR SLEEP APNEA DEVICE OR CPAP MACHINE WHEN YOU INTEND TO NAP AFTER TAKING PAIN MEDICATION. Ankle Pumps    Ankle pumps increase the circulation of oxygenated blood to your lower extremities and decrease your risk for circulation problems such as blood clots.  They also stretch the muscles, tendons and ligaments in your foot and ankle, and prevent joint contracture in the ankle and foot, especially after surgeries on the legs. It is important to do ankle pump exercises regularly after surgery because immobility increases your risk for developing a blood clot. Your doctor may also have you take an Aspirin for the next few days as well. If your doctor did not ask you to take an Aspirin, consult with him before starting Aspirin therapy on your own. Slowly point your foot forward, feeling the muscles on the top of your lower leg stretch, and hold this position for 5 seconds. Next, pull your foot back toward you as far as possible, stretching the calf muscles, and hold that position for 5 seconds. Repeat with the other foot. Perform 10 repetitions every hour while awake for both ankles if possible (down and then up with the foot once is one repetition). You should feel gentle stretching of the muscles in your lower leg when doing this exercise. If you feel pain, or your range of motion is limited, don't  Push too hard. Only go the limit your joint and muscles will let you go. If you have increasing pain, progressively worsening leg warmth or swelling, STOP the exercise and call your doctor. Below is information about the medications your doctor is prescribing after your visit:    Other information in your discharge envelope:  []     PRESCRIPTIONS  []     PHYSICAL THERAPY PRESCRIPTION  []     APPOINTMENT CARDS  []     Regional Anesthesia Pamphlet for block or block with On-Q Catheter from Anesthesia Service  []     Medical device information sheets/pamphlets from their    []     School/work excuse note. []     /parent work excuse note. These are general instructions for a healthy lifestyle:    *  Please give a list of your current medications to your Primary Care Provider.   *  Please update this list whenever your medications are discontinued, doses are      changed, or new medications (including over-the-counter products) are added.  *  Please carry medication information at all times in case of emergency situations. About Smoking  No smoking / No tobacco products / Avoid exposure to second hand smoke    Surgeon General's Warning:  Quitting smoking now greatly reduces serious risk to your health. Obesity, smoking, and sedentary lifestyle greatly increases your risk for illness and disease. A healthy diet, regular physical exercise & weight monitoring are important for maintaining a healthy lifestyle. Congestive Heart Failure  You may be retaining fluid if you have a history of heart failure or if you experience any of the following symptoms:  Weight gain of 3 pounds or more overnight or 5 pounds in a week, increased swelling in our hands or feet or shortness of breath while lying flat in bed. Please call your doctor as soon as you notice any of these symptoms; do not wait until your next office visit. Recognize signs and symptoms of STROKE:  F - face looks uneven  A - arms unable to move or move even  S - speech slurred or non-existent  T - time-call 911 as soon as signs and symptoms begin-DO NOT go         Back to bed or wait to see if you get better-TIME IS BRAIN. Warning signs of HEART ATTACK  Call 911 if you have these symptoms    · Chest discomfort. Most heart attacks involve discomfort in the center of the chest that lasts more than a few minutes, or that goes away and comes back. It can feel like uncomfortable pressure, squeezing, fullness, or pain. · Discomfort in other areas of the upper body. Symptoms can include pain or discomfort in one or both        Arms, the back, neck, jaw, or stomach. ·  Shortness of breath with or without chest discomfort. · Other signs may include breaking out in a cold sweat, nausea, or lightheadedness    Don't wait more than five minutes to call 911 - MINUTES MATTER! Fast action can save your life.   Calling 911 is almost always the fastest way to get lifesaving treatment. Emergency Medical Services staff can begin treatment when they arrive - up to an hour sooner than if someone gets to the hospital by car. [facility] MEDICATION AND SIDE EFFECT GUIDE    The [facility] MEDICATION AND SIDE EFFECT GUIDE was provided to the PATIENT AND CARE PROVIDER.   Information provided includes instruction about drug purpose and common side effects for the following medications:    · Norco

## 2019-10-29 NOTE — OP NOTES
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 PROCEDURES PERFORMED:  1. Right ductal excision. 2.  Right breast biopsy with bracketed needle localization. SURGEON:  Lord Mariam MD    ASSISTANT:  Mahi Barbosa    ANESTHESIA:  General.    COMPLICATIONS:  None. SPECIMENS REMOVED:  Right breast ducts and right breast tissue. IMPLANTS:  None. ESTIMATED BLOOD LOSS:  Minimal.    INDICATIONS:  The patient is a 56-year-old female with history of DCIS who had stereotactic biopsy and an MRI-guided biopsy both revealing intraductal papillomas and ADH. At the time of surgery, I noted a pathologic nipple discharge which was expressed from a thickening just below the right nipple. PROCEDURE:  After satisfactory induction of general LMA anesthesia, the patient was prepped and draped in the usual sterile fashion just following needle localization in Radiology. A circumareolar incision was made and deepened through subcutaneous tissue with Bovie cautery. Offending ducts were identified behind and a standard ductal excision was performed. The specimen was oriented and sent to Pathology. Dissection continued posterolaterally bringing both wires into the wound. One wire was near the clip, the second wire was a distance away from the clip. Tissue around the both wires was excised. There appeared to be multiple papillary lesions within this area. The specimen was oriented and a specimen radiograph was obtained which revealed the presence of one clip in the specimen. This was sent to pathology.   Further dissection continued and inferior, lateral, medial margins were obtained and the clip was directly visualized. A second clip was directly visualized. This specimen was removed and sent to Pathology. All dissection planes were hemostatic. The wound was closed in several layers with 3-0 Vicryl and a running subcuticular 4-0 Monocryl on the skin. The patient tolerated the procedure well. No complications. She was taken to the recovery room in stable condition.       Priya Gavin MD      [id number]/  D:  10/29/2019 16:11  T:  10/29/2019 17:00  JOB #:  [id number]  CC:  Peyton Weber MD

## 2019-10-29 NOTE — ANESTHESIA PREPROCEDURE EVALUATION
Relevant Problems   No relevant active problems       Anesthetic History   No history of anesthetic complications            Review of Systems / Medical History  Patient summary reviewed, nursing notes reviewed and pertinent labs reviewed    Pulmonary  Within defined limits                 Neuro/Psych   Within defined limits           Cardiovascular    Hypertension                   GI/Hepatic/Renal  Within defined limits              Endo/Other    Diabetes: type 2    Morbid obesity and cancer (breast)     Other Findings   Comments: History of sarcoidosis         Physical Exam    Airway  Mallampati: III  TM Distance: 4 - 6 cm  Neck ROM: normal range of motion   Mouth opening: Normal     Cardiovascular    Rhythm: regular  Rate: normal         Dental    Dentition: Lower dentition intact, Upper dentition intact and Caps/crowns     Pulmonary  Breath sounds clear to auscultation               Abdominal  GI exam deferred       Other Findings            Anesthetic Plan    ASA: 3  Anesthesia type: general          Induction: Intravenous  Anesthetic plan and risks discussed with: Patient

## 2019-10-29 NOTE — H&P
HISTORY OF PRESENT ILLNESS  Tomás Castro is a 66 y.o. female.   HPI  NEW patient presents for consultation at the request of Dr. Nitin Shine for RIGHT breast papilloma and ADH.  She is not feeling any breast lumps, has no nipple discharge/retraction or skin change.  She does have some RIGHT nipple discharge, but this is never spontaneous.       She has a history of RIGHT breast DCIS in 2012, S/P lumpectomy, no XRT or Tamoxifen/AI. Norma Avila has a history of uterine cancer in 2013.     FH is significant for two paternal aunt who had breast cancer (older age).  Several paternal uncles had prostate cancer. Apolinar Tinoco had bladder and lung.  Sister had her nipple removed for chronic issues, never diagnosed with cancer.       BILATERAL diagnostic mammogram 8/1/19 at [facility], [facility].      Stereotactic biopsy 8/12/19.   PATH: Papilloma with calcifications, ADH, fibrocystic changes.        Past Medical History:   Diagnosis Date    Anxiety      Breast cancer ([facility] [address] 2012     right DCIS    Bronchitis      Diabetes ([facility] [address]      Hypertension      Kidney stones      Pneumonia      Sarcoidosis      Uterine cancer ([facility] [address] 2013               Past Surgical History:   Procedure Laterality Date    HX BARTHOLIN CYST MARSUPIALIZATION        HX BREAST LUMPECTOMY         right    HX CHOLECYSTECTOMY        HX HYSTERECTOMY        IR INJ FORAMIN EPID LUMB ANES/STER SNGL   8/14/2019         Social History            Socioeconomic History    Marital status: UNKNOWN       Spouse name: Not on file    Number of children: Not on file    Years of education: Not on file    Highest education level: Not on file   Occupational History    Not on file   Social Needs    Financial resource strain: Not on file    Food insecurity:       Worry: Not on file       Inability: Not on file    Transportation needs:       Medical: Not on file       Non-medical: Not on file   Tobacco Use    Smoking status: Never Smoker    Smokeless tobacco: Never Used Substance and Sexual Activity    Alcohol use: Yes       Comment: rare    Drug use: No    Sexual activity: Not on file   Lifestyle    Physical activity:       Days per week: Not on file       Minutes per session: Not on file    Stress: Not on file   Relationships    Social connections:       Talks on phone: Not on file       Gets together: Not on file       Attends Oriental orthodox service: Not on file       Active member of club or organization: Not on file       Attends meetings of clubs or organizations: Not on file       Relationship status: Not on file    Intimate partner violence:       Fear of current or ex partner: Not on file       Emotionally abused: Not on file       Physically abused: Not on file       Forced sexual activity: Not on file   Other Topics Concern    Not on file   Social History Narrative    Not on file                Current Outpatient Medications on File Prior to Visit   Medication Sig Dispense Refill    traMADol (ULTRAM) 50 mg tablet Take 50 mg by mouth every six (6) hours as needed for Pain.        ALPRAZolam (XANAX) 0.25 mg tablet Take  by mouth.        atorvastatin (LIPITOR) 10 mg tablet Take 10 mg by mouth daily.        lisinopril-hydroCHLOROthiazide (PRINZIDE, ZESTORETIC) 10-12.5 mg per tablet Take 1 Tab by mouth daily.        metFORMIN (GLUCOPHAGE) 500 mg tablet Take 500 mg by mouth two (2) times daily (with meals).         B.infantis-B.ani-B.long-B.bifi (PROBIOTIC 4X) 10-15 mg TbEC Take 1 Tab by mouth daily.        aspirin 81 mg chewable tablet Take 81 mg by mouth daily.          No current facility-administered medications on file prior to visit.                Allergies   Allergen Reactions    Prednisone Other (comments)       Pressure in her eyes like glaucoma all steroids         OB History    None       Obstetric Comments   Menarche 7, LMP 2012, # of children 3, age of 4st delivery 25, Hysterectomy/oophorectomy Yes/Yes, Breast bx Yes, RIGHT , history of breast feeding No, BCP Yes, in the past, Hormone therapy No                   ROS  Constitutional: Negative.    HENT: Positive for hearing loss.    Eyes: Negative.    Respiratory: Positive for cough.    Cardiovascular: Negative.    Gastrointestinal: Negative.    Genitourinary: Negative.    Musculoskeletal: Positive for back pain and joint pain. Skin: Negative.    Neurological: Positive for tingling. Endo/Heme/Allergies: Negative.    Psychiatric/Behavioral: The patient is nervous/anxious.         Physical Exam   Cardiovascular: Normal rate, regular rhythm and normal heart sounds. Pulmonary/Chest: Breath sounds normal. Right breast exhibits no inverted nipple, no mass, no nipple discharge, no skin change and no tenderness. Left breast exhibits no inverted nipple, no mass, no nipple discharge, no skin change and no tenderness. Breasts are symmetrical.       Lymphadenopathy:     She has no cervical adenopathy. Right cervical: No superficial cervical, no deep cervical and no posterior cervical adenopathy present. Left cervical: No superficial cervical, no deep cervical and no posterior cervical adenopathy present. She has no axillary adenopathy. Right axillary: No pectoral and no lateral adenopathy present. Left axillary: No pectoral and no lateral adenopathy present.        BREAST ULTRASOUND  Indication: RIGHT breast papilloma and ADH per bx  Technique: The area was scanned using a high-frequency linear-array near-field transducer  Findings: Bx clip at RIGHT breast 7:00, in the medial edge of lumpectomy cavity  Impression: Papilloma and ADH per bx  Disposition: Excision        ASSESSMENT and PLAN      ICD-10-CM ICD-9-CM     1. At high risk for breast cancer Z91.89 V49.89 MRI BREAST BI W WO CONT   2. History of ductal carcinoma in situ (DCIS) of breast Z86.000 V13.89 MRI BREAST BI W WO CONT   3. Papilloma of right breast D24.1 217     4.  Atypical ductal hyperplasia of right breast N60.91 610.8        New patient presents for evaluation of RIGHT breast papilloma and ADH, and is doing well overall. Well healed incision s/p lumpectomy. Thickening on exam at RIGHT breast LOQ at lumpectomy site. US visualizes bx clip at RIGHT breast 7:00, in the medial edge of lumpectomy cavity. Reviewed bx PATH. Discussed excisional bx to remove bx site and clip, to confirm PATH and rule out malignancy. Based on history, recommend breast MRI prior to any surgery. Pt notes preference for imagine and treatment at [facility] or [address] [place] [address]. Will order wide bore MRI at [place], and f/u with results and for further planning, which may include surgery or additional pre-op bx. This plan was reviewed with the patient and patient agrees. All questions were answered.

## 2019-10-31 ENCOUNTER — OFFICE VISIT (OUTPATIENT)
Dept: SURGERY | Age: 59
End: 2019-10-31

## 2019-10-31 ENCOUNTER — TELEPHONE (OUTPATIENT)
Dept: SURGERY | Age: 59
End: 2019-10-31

## 2019-10-31 VITALS
SYSTOLIC BLOOD PRESSURE: 122 MMHG | WEIGHT: 284 LBS | DIASTOLIC BLOOD PRESSURE: 54 MMHG | HEIGHT: 67 IN | BODY MASS INDEX: 44.57 KG/M2 | HEART RATE: 84 BPM

## 2019-10-31 DIAGNOSIS — Z86.000 HX OF CARCINOMA IN SITU OF BREAST: ICD-10-CM

## 2019-10-31 DIAGNOSIS — Z98.890 S/P BREAST BIOPSY, RIGHT: Primary | ICD-10-CM

## 2019-10-31 NOTE — TELEPHONE ENCOUNTER
Patient is POD 2 s/p excisional biopsy and has concerns about swelling and warmth. Appointment made for her to come in this morning to be examined.

## 2019-10-31 NOTE — LETTER
10/31/19 Patient: Leyla Davies YOB: 1960 Date of Visit: 10/31/2019 Seb Coe MD 
[address] [place] [address] VIA Facsimile: [phone] Dear Seb Coe MD, Thank you for referring Ms. Leyla Davies to [facility] for evaluation. My notes for this consultation are attached. If you have questions, please do not hesitate to call me. I look forward to following your patient along with you.  
 
 
Sincerely, 
 
Dorothy Villa MD

## 2019-10-31 NOTE — PROGRESS NOTES
HISTORY OF PRESENT ILLNESS  Mamie Coats is a 64 y.o. female. HPI ESTABLISHED patient, POD #2, s/p RIGHT breast excisional biopsy with bracketed needle localization, here for RIGHT breast swelling, warmth and possible redness. Breast history:  2012: RIGHT breast DCIS, treated with lumpectomy. No XRT or Tamoxifen/AI. ROS    Physical Exam   Pulmonary/Chest: Right breast exhibits skin change (ecchymosis. incision healing well) and tenderness. Right breast exhibits no mass. ASPIRATION OF SEROMA  Indication : Seroma:  right  lower outer quadrant/subareolar  Prep : Alcohol. Guidance : Ultrasound guidance. Yield :  35cc of serosanguinous fluid was aspirated with an 18 gauge needle. Effect : Seroma completely aspirated. Tolerance: Pt tolerated the procedure with no discomfort  Disposition:  Follow up in one week if swelling recurs  ASSESSMENT and PLAN    ICD-10-CM ICD-9-CM    1. S/P breast biopsy, right Z98.890 V45.89    2. Hx of carcinoma in situ of breast Z86.000 V13.89      RIGHT breast seroma aspirated. Pathology from surgery 2 days ago is not yet back  Pt will see Dr Mathew Mcneil in 4 weeks  If fluid reaccumulates before that time pt could return for repeat aspiration.

## 2019-10-31 NOTE — PATIENT INSTRUCTIONS
Open Breast Biopsy: What to Expect at Home  Your Recovery  An open breast biopsy is surgery to take a sample of breast tissue. A breast biopsy may be done to check a lump found during a breast exam. Or it may be done to check an area of concern found on a mammogram or ultrasound. The breast tissue will be sent to a lab, where a doctor will look at the tissue under a microscope to check for breast cancer. Your doctor may have some answers right away. But it can take up to 1 to 2 weeks to get the final results. Your doctor will discuss the results with you. For a few days after the surgery, you will probably feel tired and have some pain. The skin around the cut (incision) may feel firm, swollen, and tender. The area may be bruised. Tenderness usually goes away in a few days, and the bruising within 2 weeks. Firmness and swelling may take 3 to 6 months to go away. The stitches in your incision may dissolve on their own, or the doctor may take them out 7 to 10 days after surgery. This care sheet gives you a general idea about how long it will take for you to recover. But each person recovers at a different pace. Follow the steps below to get better as quickly as possible. How can you care for yourself at home? Activity    · Rest when you feel tired. Getting enough sleep will help you recover.     · Try to walk each day. Start by walking a little more than you did the day before. Bit by bit, increase the amount you walk. Walking boosts blood flow and helps prevent pneumonia and constipation.     · For 2 weeks, avoid strenuous activities that put pressure on your chest or that involve vigorous movement of your upper body and arm on the side of the biopsy. Examples of these might include strenuous housework, holding an active child, jogging, or aerobic exercise.     · For 2 weeks, avoid lifting anything that would make you strain.  This may include heavy grocery bags and milk containers, a heavy briefcase or backpack, cat litter or dog food bags, a vacuum , or a child.     · Ask your doctor when you can drive again.     · You will probably need to take 1 or 2 days off from work. This depends on the type of work you do and how you feel. Diet    · You can eat your normal diet. If your stomach is upset, try bland, low-fat foods like plain rice, broiled chicken, toast, and yogurt. Medicines    · Your doctor will tell you if and when you can restart your medicines. He or she will also give you instructions about taking any new medicines.     · If you take blood thinners, such as warfarin (Coumadin), clopidogrel (Plavix), or aspirin, be sure to talk to your doctor. He or she will tell you if and when to start taking those medicines again. Make sure that you understand exactly what your doctor wants you to do.     · Be safe with medicines. Take pain medicines exactly as directed. ? If the doctor gave you a prescription medicine for pain, take it as prescribed. ? If you are not taking a prescription pain medicine, ask your doctor if you can take an over-the-counter medicine.     · If you think your pain medicine is making you sick to your stomach:  ? Take your medicine after meals (unless your doctor has told you not to). ? Ask your doctor for a different pain medicine.     · If your doctor prescribed antibiotics, take them as directed. Do not stop taking them just because you feel better. You need to take the full course of antibiotics. Incision care    · If you have strips of tape on the incision, leave the tape on for a week or until it falls off.     · Wash the area daily with warm, soapy water, and pat it dry. Don't use hydrogen peroxide or alcohol, which can slow healing. You may cover the area with a gauze bandage if it weeps or rubs against clothing. Change the bandage every day.     · Keep the area clean and dry.    Other instructions    · For the first 3 days after surgery, wear a supportive bra all the time, even at night. Follow-up care is a key part of your treatment and safety. Be sure to make and go to all appointments, and call your doctor if you are having problems. It's also a good idea to know your test results and keep a list of the medicines you take. When should you call for help? Call 911 anytime you think you may need emergency care. For example, call if:    · You passed out (lost consciousness).     · You have chest pain, are short of breath, or cough up blood.    Call your doctor now or seek immediate medical care if:    · You are sick to your stomach or cannot drink fluids.     · You have pain that does not get better after you take pain medicine.     · You cannot pass stools or gas.     · You have signs of a blood clot in your leg (called a deep vein thrombosis), such as:  ? Pain in your calf, back of the knee, thigh, or groin. ? Redness or swelling in your leg.     · You have signs of infection, such as:  ? Increased pain, swelling, warmth, or redness. ? Red streaks leading from the incision. ? Pus draining from the incision. ? A fever.     · You have loose stitches, or your incision comes open.     · Bright red blood has soaked through the bandage over your incision.    Watch closely for changes in your health, and be sure to contact your doctor if:    · You have any problems.     · You have new or worse swelling or pain in your arm. Where can you learn more? Go to http://marilyn-juan.info/. Enter Z593 in the search box to learn more about \"Open Breast Biopsy: What to Expect at Home. \"  Current as of: December 19, 2018  Content Version: 12.2  © 9157-8177 [employer]. Care instructions adapted under license by [employer] (which disclaims liability or warranty for this information).  If you have questions about a medical condition or this instruction, always ask your healthcare professional. Facundo Raymond disclaims any warranty or liability for your use of this information.

## 2019-11-04 ENCOUNTER — TELEPHONE (OUTPATIENT)
Dept: SURGERY | Age: 59
End: 2019-11-04

## 2019-11-25 ENCOUNTER — OFFICE VISIT (OUTPATIENT)
Dept: SURGERY | Age: 59
End: 2019-11-25

## 2019-11-25 VITALS — BODY MASS INDEX: 44.57 KG/M2 | HEIGHT: 67 IN | WEIGHT: 284 LBS

## 2019-11-25 DIAGNOSIS — D24.1 PAPILLOMA OF RIGHT BREAST: ICD-10-CM

## 2019-11-25 DIAGNOSIS — Z91.89 AT HIGH RISK FOR BREAST CANCER: ICD-10-CM

## 2019-11-25 DIAGNOSIS — Z86.000 HISTORY OF DUCTAL CARCINOMA IN SITU (DCIS) OF BREAST: ICD-10-CM

## 2019-11-25 DIAGNOSIS — N60.91 ATYPICAL DUCTAL HYPERPLASIA OF RIGHT BREAST: Primary | ICD-10-CM

## 2019-11-25 NOTE — PROGRESS NOTES
HISTORY OF PRESENT ILLNESS Arina Marcano is a 64 y.o. female. HPI ESTABLISHED patient here for post op follow up RIGHT breast excisional biopsy. She is doing well. Did have some \"zingers\" pain and swelling to her RIGHT breast on Friday. The swelling has gone away. Incisions are dry and intact. 10/31/19 - 35ml  Seroma fluid aspirated 10/29/19 - RIGHT breast excisional biopsy - ADH and Papilloma 2012: RIGHT breast DCIS, treated with lumpectomy. No XRT or Tamoxifen/AI. ROS Physical Exam 
 
ASSESSMENT and PLAN 
{ASSESSMENT/PLAN:24668}

## 2019-11-25 NOTE — PATIENT INSTRUCTIONS

## 2019-11-27 NOTE — PROGRESS NOTES
HISTORY OF PRESENT ILLNESS  Caryl Cotton is a 64 y.o. female. HPI  ESTABLISHED patient here for post op follow up RIGHT breast excisional biopsy. She is doing well. Did have some \"zingers\" pain and swelling to her RIGHT breast on Friday. The swelling has gone away. Incisions are dry and intact.      10/31/19 - 35ml  Seroma fluid aspirated     10/29/19 - RIGHT breast excisional biopsy - ADH and Papilloma      2012: RIGHT breast DCIS, treated with lumpectomy. No XRT or Tamoxifen/AI. Pt reports negative genetic testing. Past Medical History:   Diagnosis Date    Anxiety     Arthritis     Bell's palsy 08/1997    Breast cancer ([facility] [address] 2012    right DCIS    Bronchitis     Diabetes ([facility] [address]     GERD (gastroesophageal reflux disease)     Hypertension     Kidney stones     Morbid obesity ([facility] [address]     Pneumonia     Sarcoidosis     Spinal stenosis     Uterine cancer ([facility] [address] 2013       Past Surgical History:   Procedure Laterality Date    HX BARTHOLIN CYST MARSUPIALIZATION      HX BREAST BIOPSY Right 10/29/2019    RIGHT BREAST EXCISIONAL BIOPSY WITH BRACKETED NEEDLE LOCALIZATION performed by Boby Hinson MD at [address] HX BREAST LUMPECTOMY      right    HX CHOLECYSTECTOMY      HX ENDOSCOPY  10/2017    Upper GI    HX HYSTERECTOMY      IR INJ FORAMIN EPID LUMB ANES/STER SNGL  8/14/2019       Social History     Socioeconomic History    Marital status:      Spouse name: Not on file    Number of children: Not on file    Years of education: Not on file    Highest education level: Not on file   Occupational History    Not on file   Social Needs    Financial resource strain: Not on file    Food insecurity:     Worry: Not on file     Inability: Not on file    Transportation needs:     Medical: Not on file     Non-medical: Not on file   Tobacco Use    Smoking status: Never Smoker    Smokeless tobacco: Never Used   Substance and Sexual Activity    Alcohol use:  Yes Comment: rare    Drug use: No    Sexual activity: Not on file   Lifestyle    Physical activity:     Days per week: Not on file     Minutes per session: Not on file    Stress: Not on file   Relationships    Social connections:     Talks on phone: Not on file     Gets together: Not on file     Attends Congregation service: Not on file     Active member of club or organization: Not on file     Attends meetings of clubs or organizations: Not on file     Relationship status: Not on file    Intimate partner violence:     Fear of current or ex partner: Not on file     Emotionally abused: Not on file     Physically abused: Not on file     Forced sexual activity: Not on file   Other Topics Concern    Not on file   Social History Narrative    Not on file       Current Outpatient Medications on File Prior to Visit   Medication Sig Dispense Refill    aspirin delayed-release 81 mg tablet Take 81 mg by mouth daily. Indications: Rx by PCP for [facility]      lisinopril-hydroCHLOROthiazide (PRINZIDE, ZESTORETIC) 20-25 mg per tablet Take 1 Tab by mouth daily.  acetaminophen (TYLENOL ARTHRITIS PO) Take 2 Tabs by mouth every eight to twelve (8-12) hours as needed.  traMADol (ULTRAM) 50 mg tablet Take 50 mg by mouth every six (6) hours as needed for Pain.  atorvastatin (LIPITOR) 10 mg tablet Take 10 mg by mouth nightly.  metFORMIN (GLUCOPHAGE) 500 mg tablet Take 1,000 mg by mouth two (2) times daily (with meals).  B.infantis-B.ani-B.long-B.bifi (PROBIOTIC 4X) 10-15 mg TbEC Take 1 Tab by mouth daily.  ALPRAZolam (XANAX) 0.25 mg tablet Take 0.25 mg by mouth four (4) times daily as needed. No current facility-administered medications on file prior to visit. Allergies   Allergen Reactions    Prednisone Other (comments)     Pressure in her eyes like glaucoma all steroids. 10 24.19 states problem ONLY with Eye Gtts. OB History    No obstetric history on file.       Obstetric Comments Menarche 11, LMP 2012, # of children 3, age of 6st delivery 25, Hysterectomy/oophorectomy Yes/Yes, Breast bx Yes, RIGHT , history of breast feeding No, BCP Yes, in the past, Hormone therapy No               ROS    Physical Exam  Exam conducted with a chaperone present. Cardiovascular:      Rate and Rhythm: Normal rate and regular rhythm. Heart sounds: Normal heart sounds. Pulmonary:      Breath sounds: Normal breath sounds. Chest:      Breasts: Breasts are symmetrical.         Right: Normal. No swelling, bleeding, inverted nipple, mass, nipple discharge, skin change or tenderness. Left: Normal. No swelling, bleeding, inverted nipple, mass, nipple discharge, skin change or tenderness. Lymphadenopathy:      Cervical:      Right cervical: No superficial, deep or posterior cervical adenopathy. Left cervical: No superficial, deep or posterior cervical adenopathy. Upper Body:      Right upper body: No supraclavicular or axillary adenopathy. Left upper body: No supraclavicular or axillary adenopathy. ASSESSMENT and PLAN    ICD-10-CM ICD-9-CM    1. Atypical ductal hyperplasia of right breast N60.91 610.8    2. Papilloma of right breast D24.1 217    3. At high risk for breast cancer Z91.89 V49.89    4. History of ductal carcinoma in situ (DCIS) of breast Z86.000 V13.89       Patient presents for f/u s/p RIGHT ductal excision and excisional bx on 10/29/19, and is doing well overall. RIGHT breast looks better, with well healed incision at LOQ of areola. Pt states that as this was her 4th breast bx since 2012, she is concerned about \"something slipping through the cracks in the future. \" Discussed increased global risk for breast cancer with atypia and papillomas, as well as risks associated with prophylactic BL mastectomy for risk reduction. Pt is unsure whether she would want reconstruction; will refer to plastic surgeon for further consultation.  As is, pt would need annual breast MRI in addition to mammogram for imaging surveillance. F/U after plastic surgery consultation for further planning. This plan was reviewed with the patient and patient agrees. All questions were answered.     Written by Adis Barvo, as dictated by Dr. Radha Lopez MD.

## 2019-11-27 NOTE — COMMUNICATION BODY
HISTORY OF PRESENT ILLNESS  Phil Silva is a 64 y.o. female. HPI  ESTABLISHED patient here for post op follow up RIGHT breast excisional biopsy. She is doing well. Did have some \"zingers\" pain and swelling to her RIGHT breast on Friday. The swelling has gone away. Incisions are dry and intact.      10/31/19 - 35ml  Seroma fluid aspirated     10/29/19 - RIGHT breast excisional biopsy - ADH and Papilloma      2012: RIGHT breast DCIS, treated with lumpectomy. No XRT or Tamoxifen/AI. Pt reports negative genetic testing. Past Medical History:   Diagnosis Date    Anxiety     Arthritis     Bell's palsy 08/1997    Breast cancer ([facility] [address] 2012    right DCIS    Bronchitis     Diabetes ([facility] [address]     GERD (gastroesophageal reflux disease)     Hypertension     Kidney stones     Morbid obesity ([facility] [address]     Pneumonia     Sarcoidosis     Spinal stenosis     Uterine cancer ([facility] [address] 2013       Past Surgical History:   Procedure Laterality Date    HX BARTHOLIN CYST MARSUPIALIZATION      HX BREAST BIOPSY Right 10/29/2019    RIGHT BREAST EXCISIONAL BIOPSY WITH BRACKETED NEEDLE LOCALIZATION performed by Mendy Salmeron MD at [address] HX BREAST LUMPECTOMY      right    HX CHOLECYSTECTOMY      HX ENDOSCOPY  10/2017    Upper GI    HX HYSTERECTOMY      IR INJ FORAMIN EPID LUMB ANES/STER SNGL  8/14/2019       Social History     Socioeconomic History    Marital status:      Spouse name: Not on file    Number of children: Not on file    Years of education: Not on file    Highest education level: Not on file   Occupational History    Not on file   Social Needs    Financial resource strain: Not on file    Food insecurity:     Worry: Not on file     Inability: Not on file    Transportation needs:     Medical: Not on file     Non-medical: Not on file   Tobacco Use    Smoking status: Never Smoker    Smokeless tobacco: Never Used   Substance and Sexual Activity    Alcohol use:  Yes Comment: rare    Drug use: No    Sexual activity: Not on file   Lifestyle    Physical activity:     Days per week: Not on file     Minutes per session: Not on file    Stress: Not on file   Relationships    Social connections:     Talks on phone: Not on file     Gets together: Not on file     Attends Quaker service: Not on file     Active member of club or organization: Not on file     Attends meetings of clubs or organizations: Not on file     Relationship status: Not on file    Intimate partner violence:     Fear of current or ex partner: Not on file     Emotionally abused: Not on file     Physically abused: Not on file     Forced sexual activity: Not on file   Other Topics Concern    Not on file   Social History Narrative    Not on file       Current Outpatient Medications on File Prior to Visit   Medication Sig Dispense Refill    aspirin delayed-release 81 mg tablet Take 81 mg by mouth daily. Indications: Rx by PCP for [facility]      lisinopril-hydroCHLOROthiazide (PRINZIDE, ZESTORETIC) 20-25 mg per tablet Take 1 Tab by mouth daily.  acetaminophen (TYLENOL ARTHRITIS PO) Take 2 Tabs by mouth every eight to twelve (8-12) hours as needed.  traMADol (ULTRAM) 50 mg tablet Take 50 mg by mouth every six (6) hours as needed for Pain.  atorvastatin (LIPITOR) 10 mg tablet Take 10 mg by mouth nightly.  metFORMIN (GLUCOPHAGE) 500 mg tablet Take 1,000 mg by mouth two (2) times daily (with meals).  B.infantis-B.ani-B.long-B.bifi (PROBIOTIC 4X) 10-15 mg TbEC Take 1 Tab by mouth daily.  ALPRAZolam (XANAX) 0.25 mg tablet Take 0.25 mg by mouth four (4) times daily as needed. No current facility-administered medications on file prior to visit. Allergies   Allergen Reactions    Prednisone Other (comments)     Pressure in her eyes like glaucoma all steroids. 10 24.19 states problem ONLY with Eye Gtts. OB History    No obstetric history on file.       Obstetric Comments Menarche 11, LMP 2012, # of children 3, age of 4st delivery 25, Hysterectomy/oophorectomy Yes/Yes, Breast bx Yes, RIGHT , history of breast feeding No, BCP Yes, in the past, Hormone therapy No               ROS    Physical Exam  Exam conducted with a chaperone present. Cardiovascular:      Rate and Rhythm: Normal rate and regular rhythm. Heart sounds: Normal heart sounds. Pulmonary:      Breath sounds: Normal breath sounds. Chest:      Breasts: Breasts are symmetrical.         Right: Normal. No swelling, bleeding, inverted nipple, mass, nipple discharge, skin change or tenderness. Left: Normal. No swelling, bleeding, inverted nipple, mass, nipple discharge, skin change or tenderness. Lymphadenopathy:      Cervical:      Right cervical: No superficial, deep or posterior cervical adenopathy. Left cervical: No superficial, deep or posterior cervical adenopathy. Upper Body:      Right upper body: No supraclavicular or axillary adenopathy. Left upper body: No supraclavicular or axillary adenopathy. ASSESSMENT and PLAN    ICD-10-CM ICD-9-CM    1. Atypical ductal hyperplasia of right breast N60.91 610.8    2. Papilloma of right breast D24.1 217    3. At high risk for breast cancer Z91.89 V49.89    4. History of ductal carcinoma in situ (DCIS) of breast Z86.000 V13.89       Patient presents for f/u s/p RIGHT ductal excision and excisional bx on 10/29/19, and is doing well overall. RIGHT breast looks better, with well healed incision at LOQ of areola. Pt states that as this was her 4th breast bx since 2012, she is concerned about \"something slipping through the cracks in the future. \" Discussed increased global risk for breast cancer with atypia and papillomas, as well as risks associated with prophylactic BL mastectomy for risk reduction. Pt is unsure whether she would want reconstruction; will refer to plastic surgeon for further consultation.  As is, pt would need annual breast MRI in addition to mammogram for imaging surveillance. F/U after plastic surgery consultation for further planning. This plan was reviewed with the patient and patient agrees. All questions were answered.     Written by Dimitrios Campbell, as dictated by Dr. Donis Glass MD.

## 2020-02-12 DIAGNOSIS — N60.91 BENIGN MAMMARY DYSPLASIA OF RIGHT BREAST: Primary | ICD-10-CM

## 2020-03-09 ENCOUNTER — OFFICE VISIT (OUTPATIENT)
Dept: SURGERY | Age: 60
End: 2020-03-09

## 2020-03-09 VITALS
BODY MASS INDEX: 44.57 KG/M2 | SYSTOLIC BLOOD PRESSURE: 115 MMHG | WEIGHT: 284 LBS | DIASTOLIC BLOOD PRESSURE: 50 MMHG | HEIGHT: 67 IN | HEART RATE: 91 BPM

## 2020-03-09 DIAGNOSIS — Z86.000 HISTORY OF DUCTAL CARCINOMA IN SITU (DCIS) OF BREAST: ICD-10-CM

## 2020-03-09 DIAGNOSIS — Z91.89 AT HIGH RISK FOR BREAST CANCER: Primary | ICD-10-CM

## 2020-03-09 NOTE — PROGRESS NOTES
HISTORY OF PRESENT ILLNESS  Jesenia Martino is a 64 y.o. female. HPI  ESTABLISHED patient here to discuss possible mastectomies. She has a recent history of RIGHT ADH and has a history of RIGHT DCIS in 2012. Is not feeling any breast lumps, has no breast concerns today. Imaging is up-to-date. Diabetes has not been under the best control recently. 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 No XRT or Tamoxifen/AI.       Past Medical History:   Diagnosis Date    Anxiety     Arthritis     Bell's palsy 08/1997    Breast cancer ([facility] [address] 2012    right DCIS    Bronchitis     Diabetes ([facility] [address]     GERD (gastroesophageal reflux disease)     Hypertension     Kidney stones     Morbid obesity ([facility] [address]     Pneumonia     Sarcoidosis     Spinal stenosis     Uterine cancer ([facility] [address] 2013       Past Surgical History:   Procedure Laterality Date    HX BARTHOLIN CYST MARSUPIALIZATION      HX BREAST BIOPSY Right 10/29/2019    RIGHT BREAST EXCISIONAL BIOPSY WITH BRACKETED NEEDLE LOCALIZATION performed by Stephenie Low MD at [place] [address] HX BREAST LUMPECTOMY      right    HX CHOLECYSTECTOMY      HX ENDOSCOPY  10/2017    Upper GI    HX HYSTERECTOMY      IR INJ FORAMIN EPID LUMB ANES/STER SNGL  8/14/2019       Social History     Socioeconomic History    Marital status:      Spouse name: Not on file    Number of children: Not on file    Years of education: Not on file    Highest education level: Not on file   Occupational History    Not on file   Social Needs    Financial resource strain: Not on file    Food insecurity:     Worry: Not on file     Inability: Not on file    Transportation needs:     Medical: Not on file     Non-medical: Not on file   Tobacco Use    Smoking status: Never Smoker    Smokeless tobacco: Never Used   Substance and Sexual Activity    Alcohol use: Yes     Comment: rare    Drug use: No    Sexual activity: Not on file   Lifestyle    Physical activity:     Days per week: Not on file     Minutes per session: Not on file    Stress: Not on file   Relationships    Social connections:     Talks on phone: Not on file     Gets together: Not on file     Attends Zoroastrian service: Not on file     Active member of club or organization: Not on file     Attends meetings of clubs or organizations: Not on file     Relationship status: Not on file    Intimate partner violence:     Fear of current or ex partner: Not on file     Emotionally abused: Not on file     Physically abused: Not on file     Forced sexual activity: Not on file   Other Topics Concern    Not on file   Social History Narrative    Not on file       Current Outpatient Medications on File Prior to Visit   Medication Sig Dispense Refill    aspirin delayed-release 81 mg tablet Take 81 mg by mouth daily. Indications: Rx by PCP for Heart Health      lisinopril-hydroCHLOROthiazide (PRINZIDE, ZESTORETIC) 20-25 mg per tablet Take 1 Tab by mouth daily.  acetaminophen (TYLENOL ARTHRITIS PO) Take 2 Tabs by mouth every eight to twelve (8-12) hours as needed.  traMADol (ULTRAM) 50 mg tablet Take 50 mg by mouth every six (6) hours as needed for Pain.  atorvastatin (LIPITOR) 10 mg tablet Take 10 mg by mouth nightly.  metFORMIN (GLUCOPHAGE) 500 mg tablet Take 1,000 mg by mouth two (2) times daily (with meals).  B.infantis-B.ani-B.long-B.bifi (PROBIOTIC 4X) 10-15 mg TbEC Take 1 Tab by mouth daily.  ALPRAZolam (XANAX) 0.25 mg tablet Take 0.25 mg by mouth four (4) times daily as needed. No current facility-administered medications on file prior to visit. Allergies   Allergen Reactions    Prednisone Other (comments)     Pressure in her eyes like glaucoma all steroids. 10 24.19 states problem ONLY with Eye Gtts. OB History    No obstetric history on file.       Obstetric Comments   Menarche 7, LMP 2012, # of children 1, age of 4st delivery 25, Hysterectomy/oophorectomy Yes/Yes, Breast bx Yes, RIGHT , history of breast feeding No, BCP Yes, in the past, Hormone therapy No               ROS    Physical Exam  Exam conducted with a chaperone present. Cardiovascular:      Rate and Rhythm: Normal rate and regular rhythm. Heart sounds: Normal heart sounds. Pulmonary:      Breath sounds: Normal breath sounds. Chest:      Breasts: Breasts are symmetrical.         Right: Normal. No swelling, bleeding, inverted nipple, mass, nipple discharge, skin change or tenderness. Left: Normal. No swelling, bleeding, inverted nipple, mass, nipple discharge, skin change or tenderness. Lymphadenopathy:      Cervical:      Right cervical: No superficial, deep or posterior cervical adenopathy. Left cervical: No superficial, deep or posterior cervical adenopathy. Upper Body:      Right upper body: No supraclavicular or axillary adenopathy. Left upper body: No supraclavicular or axillary adenopathy. ASSESSMENT and PLAN    ICD-10-CM ICD-9-CM    1. At high risk for breast cancer Z91.89 V49.89    2. History of ductal carcinoma in situ (DCIS) of breast Z86.000 V13.89       Pt presents to discuss possible mastectomies, and is doing well overall. Well healed incision s/p lumpectomy, no evidence of local recurrence. A total of 40 minutes were spent face-to-face with the patient during this encounter and over half of that time was spent on counseling and coordination of care. Discussed risk reduction with BL mastectomy. Pt notes preference for skin-sparing mastectomy. Discussed circumareolar incisions, with removal of nipples. Will not plan to remove any LNs during this surgery. Pt has had consultation with plastic surgeon, but is still undecided about reconstruction. She has decided to have expanders placed, and will decide on final plan later with her plastic surgeon. I agree with this plan. Discussed pulmonary sarcoidosis and diabetes.  Pt states most recent A1C was about 6.4, about 3-4 months ago. Pt also notes back pain, for which she takes Tramadol about every other day. She is not pursuing back surgery at this time, and states that she would like to have her breast surgery prior to any back surgery. Pt will review plan for surgery with her other medical providers for surgical clearance, and will call to schedule surgery. This plan was reviewed with the patient and patient agrees. All questions were answered.     Written by Wanda Boss, as dictated by Dr. Leila Gutierrez MD.

## 2020-03-09 NOTE — PROGRESS NOTES
HISTORY OF PRESENT ILLNESS Donnell Maldonado is a 64 y.o. female. HPI   ESTABLISHED patient here to discuss possible mastectomies. She has a recent history of RIGHT ADH and has a history of RIGHT DCIS in 2012. Is not feeling any breast lumps, has no breast concerns today. Imaging is up-to-date. Diabetes has not been under the best control recently. 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 No XRT or Tamoxifen/AI. [facility] Physical Exam 
 
ASSESSMENT and PLAN 
{ASSESSMENT/PLAN:[id number]}

## 2020-03-09 NOTE — COMMUNICATION BODY
HISTORY OF PRESENT ILLNESS  Drea Mckeon is a 64 y.o. female. HPI  ESTABLISHED patient here to discuss possible mastectomies. She has a recent history of RIGHT ADH and has a history of RIGHT DCIS in 2012. Is not feeling any breast lumps, has no breast concerns today. Imaging is up-to-date. Diabetes has not been under the best control recently. 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 No XRT or Tamoxifen/AI.       Past Medical History:   Diagnosis Date    Anxiety     Arthritis     Bell's palsy 08/1997    Breast cancer ([address] 2012    right DCIS    Bronchitis     Diabetes ([facility] [address]     GERD (gastroesophageal reflux disease)     Hypertension     Kidney stones     Morbid obesity ([facility] [address]     Pneumonia     Sarcoidosis     Spinal stenosis     Uterine cancer ([facility] [address] 2013       Past Surgical History:   Procedure Laterality Date    HX BARTHOLIN CYST MARSUPIALIZATION      HX BREAST BIOPSY Right 10/29/2019    RIGHT BREAST EXCISIONAL BIOPSY WITH BRACKETED NEEDLE LOCALIZATION performed by Josue Bee MD at [place] [address] HX BREAST LUMPECTOMY      right    HX CHOLECYSTECTOMY      HX ENDOSCOPY  10/2017    Upper GI    HX HYSTERECTOMY      IR INJ FORAMIN EPID LUMB ANES/STER SNGL  8/14/2019       Social History     Socioeconomic History    Marital status:      Spouse name: Not on file    Number of children: Not on file    Years of education: Not on file    Highest education level: Not on file   Occupational History    Not on file   Social Needs    Financial resource strain: Not on file    Food insecurity:     Worry: Not on file     Inability: Not on file    Transportation needs:     Medical: Not on file     Non-medical: Not on file   Tobacco Use    Smoking status: Never Smoker    Smokeless tobacco: Never Used   Substance and Sexual Activity    Alcohol use: Yes     Comment: rare    Drug use: No    Sexual activity: Not on file   Lifestyle    Physical activity:     Days per week: Not on file     Minutes per session: Not on file    Stress: Not on file   Relationships    Social connections:     Talks on phone: Not on file     Gets together: Not on file     Attends Rastafarian service: Not on file     Active member of club or organization: Not on file     Attends meetings of clubs or organizations: Not on file     Relationship status: Not on file    Intimate partner violence:     Fear of current or ex partner: Not on file     Emotionally abused: Not on file     Physically abused: Not on file     Forced sexual activity: Not on file   Other Topics Concern    Not on file   Social History Narrative    Not on file       Current Outpatient Medications on File Prior to Visit   Medication Sig Dispense Refill    aspirin delayed-release 81 mg tablet Take 81 mg by mouth daily. Indications: Rx by PCP for Heart Health      lisinopril-hydroCHLOROthiazide (PRINZIDE, ZESTORETIC) 20-25 mg per tablet Take 1 Tab by mouth daily.  acetaminophen (TYLENOL ARTHRITIS PO) Take 2 Tabs by mouth every eight to twelve (8-12) hours as needed.  traMADol (ULTRAM) 50 mg tablet Take 50 mg by mouth every six (6) hours as needed for Pain.  atorvastatin (LIPITOR) 10 mg tablet Take 10 mg by mouth nightly.  metFORMIN (GLUCOPHAGE) 500 mg tablet Take 1,000 mg by mouth two (2) times daily (with meals).  B.infantis-B.ani-B.long-B.bifi (PROBIOTIC 4X) 10-15 mg TbEC Take 1 Tab by mouth daily.  ALPRAZolam (XANAX) 0.25 mg tablet Take 0.25 mg by mouth four (4) times daily as needed. No current facility-administered medications on file prior to visit. Allergies   Allergen Reactions    Prednisone Other (comments)     Pressure in her eyes like glaucoma all steroids. 10 24.19 states problem ONLY with Eye Gtts. OB History    No obstetric history on file.       Obstetric Comments   Menarche 7, LMP 2012, # of children 1, age of 6st delivery 25, Hysterectomy/oophorectomy Yes/Yes, Breast bx Yes, RIGHT , history of breast feeding No, BCP Yes, in the past, Hormone therapy No               ROS    Physical Exam  Exam conducted with a chaperone present. Cardiovascular:      Rate and Rhythm: Normal rate and regular rhythm. Heart sounds: Normal heart sounds. Pulmonary:      Breath sounds: Normal breath sounds. Chest:      Breasts: Breasts are symmetrical.         Right: Normal. No swelling, bleeding, inverted nipple, mass, nipple discharge, skin change or tenderness. Left: Normal. No swelling, bleeding, inverted nipple, mass, nipple discharge, skin change or tenderness. Lymphadenopathy:      Cervical:      Right cervical: No superficial, deep or posterior cervical adenopathy. Left cervical: No superficial, deep or posterior cervical adenopathy. Upper Body:      Right upper body: No supraclavicular or axillary adenopathy. Left upper body: No supraclavicular or axillary adenopathy. ASSESSMENT and PLAN    ICD-10-CM ICD-9-CM    1. At high risk for breast cancer Z91.89 V49.89    2. History of ductal carcinoma in situ (DCIS) of breast Z86.000 V13.89       Pt presents to discuss possible mastectomies, and is doing well overall. Well healed incision s/p lumpectomy, no evidence of local recurrence. A total of 40 minutes were spent face-to-face with the patient during this encounter and over half of that time was spent on counseling and coordination of care. Discussed risk reduction with BL mastectomy. Pt notes preference for skin-sparing mastectomy. Discussed circumareolar incisions, with removal of nipples. Will not plan to remove any LNs during this surgery. Pt has had consultation with plastic surgeon, but is still undecided about reconstruction. She has decided to have expanders placed, and will decide on final plan later with her plastic surgeon. I agree with this plan. Discussed pulmonary sarcoidosis and diabetes.  Pt states most recent A1C was about 6.4, about 3-4 months ago. Pt also notes back pain, for which she takes Tramadol about every other day. She is not pursuing back surgery at this time, and states that she would like to have her breast surgery prior to any back surgery. Pt will review plan for surgery with her other medical providers for surgical clearance, and will call to schedule surgery. This plan was reviewed with the patient and patient agrees. All questions were answered.     Written by Manny English, as dictated by Dr. Jayla Garcia MD.

## 2022-01-19 ENCOUNTER — APPOINTMENT (OUTPATIENT)
Dept: GENERAL RADIOLOGY | Age: 62
End: 2022-01-19
Attending: EMERGENCY MEDICINE
Payer: COMMERCIAL

## 2022-01-19 ENCOUNTER — HOSPITAL ENCOUNTER (EMERGENCY)
Age: 62
Discharge: HOME | End: 2022-01-19
Attending: EMERGENCY MEDICINE
Payer: COMMERCIAL

## 2022-01-19 VITALS
HEIGHT: 67 IN | SYSTOLIC BLOOD PRESSURE: 182 MMHG | RESPIRATION RATE: 18 BRPM | TEMPERATURE: 97.8 F | OXYGEN SATURATION: 99 % | BODY MASS INDEX: 44.42 KG/M2 | WEIGHT: 283 LBS | DIASTOLIC BLOOD PRESSURE: 84 MMHG | HEART RATE: 100 BPM

## 2022-01-19 DIAGNOSIS — N12 PYELONEPHRITIS: Primary | ICD-10-CM

## 2022-01-19 LAB
ALBUMIN SERPL-MCNC: 3.1 G/DL (ref 3.5–5)
ALBUMIN/GLOB SERPL: 0.6 {RATIO} (ref 1.1–2.2)
ALP SERPL-CCNC: 68 U/L (ref 45–117)
ALT SERPL-CCNC: 20 U/L (ref 12–78)
ANION GAP SERPL CALC-SCNC: 9 MMOL/L (ref 5–15)
APPEARANCE UR: CLEAR
AST SERPL-CCNC: 22 U/L (ref 15–37)
BACTERIA URNS QL MICRO: ABNORMAL /HPF
BASOPHILS # BLD: 0 K/UL (ref 0–0.1)
BASOPHILS NFR BLD: 0 % (ref 0–1)
BILIRUB SERPL-MCNC: 0.6 MG/DL (ref 0.2–1)
BILIRUB UR QL: NEGATIVE
BUN SERPL-MCNC: 20 MG/DL (ref 6–20)
BUN/CREAT SERPL: 20 (ref 12–20)
CALCIUM SERPL-MCNC: 10 MG/DL (ref 8.5–10.1)
CHLORIDE SERPL-SCNC: 98 MMOL/L (ref 97–108)
CO2 SERPL-SCNC: 25 MMOL/L (ref 21–32)
COLOR UR: ABNORMAL
CREAT SERPL-MCNC: 1.02 MG/DL (ref 0.55–1.02)
DIFFERENTIAL METHOD BLD: ABNORMAL
EOSINOPHIL # BLD: 0.3 K/UL (ref 0–0.4)
EOSINOPHIL NFR BLD: 2 % (ref 0–7)
EPITH CASTS URNS QL MICRO: ABNORMAL /LPF
ERYTHROCYTE [DISTWIDTH] IN BLOOD BY AUTOMATED COUNT: 16.3 % (ref 11.5–14.5)
GLOBULIN SER CALC-MCNC: 5 G/DL (ref 2–4)
GLUCOSE SERPL-MCNC: 105 MG/DL (ref 65–100)
GLUCOSE UR STRIP.AUTO-MCNC: NEGATIVE MG/DL
HCT VFR BLD AUTO: 36.5 % (ref 35–47)
HGB BLD-MCNC: 10.6 G/DL (ref 11.5–16)
HGB UR QL STRIP: ABNORMAL
IMM GRANULOCYTES # BLD AUTO: 0.1 K/UL (ref 0–0.04)
IMM GRANULOCYTES NFR BLD AUTO: 0 % (ref 0–0.5)
KETONES UR QL STRIP.AUTO: NEGATIVE MG/DL
LACTATE SERPL-SCNC: 1.4 MMOL/L (ref 0.4–2)
LEUKOCYTE ESTERASE UR QL STRIP.AUTO: ABNORMAL
LYMPHOCYTES # BLD: 2.6 K/UL (ref 0.8–3.5)
LYMPHOCYTES NFR BLD: 19 % (ref 12–49)
MCH RBC QN AUTO: 22.8 PG (ref 26–34)
MCHC RBC AUTO-ENTMCNC: 29 G/DL (ref 30–36.5)
MCV RBC AUTO: 78.7 FL (ref 80–99)
MONOCYTES # BLD: 2 K/UL (ref 0–1)
MONOCYTES NFR BLD: 15 % (ref 5–13)
NEUTS SEG # BLD: 8.7 K/UL (ref 1.8–8)
NEUTS SEG NFR BLD: 63 % (ref 32–75)
NITRITE UR QL STRIP.AUTO: POSITIVE
NRBC # BLD: 0 K/UL (ref 0–0.01)
NRBC BLD-RTO: 0 PER 100 WBC
PH UR STRIP: 5.5 [PH] (ref 5–8)
PLATELET # BLD AUTO: 318 K/UL (ref 150–400)
PMV BLD AUTO: 8.9 FL (ref 8.9–12.9)
POTASSIUM SERPL-SCNC: 3.9 MMOL/L (ref 3.5–5.1)
PROT SERPL-MCNC: 8.1 G/DL (ref 6.4–8.2)
PROT UR STRIP-MCNC: NEGATIVE MG/DL
RBC # BLD AUTO: 4.64 M/UL (ref 3.8–5.2)
RBC #/AREA URNS HPF: ABNORMAL /HPF (ref 0–5)
SARS-COV-2, COV2: NORMAL
SODIUM SERPL-SCNC: 132 MMOL/L (ref 136–145)
SP GR UR REFRACTOMETRY: 1.02 (ref 1–1.03)
UR CULT HOLD, URHOLD: NORMAL
UROBILINOGEN UR QL STRIP.AUTO: 0.2 EU/DL (ref 0.2–1)
WBC # BLD AUTO: 13.7 K/UL (ref 3.6–11)
WBC URNS QL MICRO: ABNORMAL /HPF (ref 0–4)

## 2022-01-19 PROCEDURE — 80053 COMPREHEN METABOLIC PANEL: CPT

## 2022-01-19 PROCEDURE — 74011250636 HC RX REV CODE- 250/636: Performed by: EMERGENCY MEDICINE

## 2022-01-19 PROCEDURE — 71045 X-RAY EXAM CHEST 1 VIEW: CPT

## 2022-01-19 PROCEDURE — 74011000250 HC RX REV CODE- 250: Performed by: EMERGENCY MEDICINE

## 2022-01-19 PROCEDURE — U0005 INFEC AGEN DETEC AMPLI PROBE: HCPCS

## 2022-01-19 PROCEDURE — 96375 TX/PRO/DX INJ NEW DRUG ADDON: CPT

## 2022-01-19 PROCEDURE — 85025 COMPLETE CBC W/AUTO DIFF WBC: CPT

## 2022-01-19 PROCEDURE — 99283 EMERGENCY DEPT VISIT LOW MDM: CPT

## 2022-01-19 PROCEDURE — 81001 URINALYSIS AUTO W/SCOPE: CPT

## 2022-01-19 PROCEDURE — 83605 ASSAY OF LACTIC ACID: CPT

## 2022-01-19 PROCEDURE — 96374 THER/PROPH/DIAG INJ IV PUSH: CPT

## 2022-01-19 PROCEDURE — 36415 COLL VENOUS BLD VENIPUNCTURE: CPT

## 2022-01-19 RX ORDER — CEFDINIR 300 MG/1
300 CAPSULE ORAL 2 TIMES DAILY
Qty: 14 CAPSULE | Refills: 0 | Status: SHIPPED | OUTPATIENT
Start: 2022-01-19 | End: 2022-01-26

## 2022-01-19 RX ORDER — KETOROLAC TROMETHAMINE 30 MG/ML
15 INJECTION, SOLUTION INTRAMUSCULAR; INTRAVENOUS
Status: COMPLETED | OUTPATIENT
Start: 2022-01-19 | End: 2022-01-19

## 2022-01-19 RX ORDER — FLUCONAZOLE 100 MG/1
100 TABLET ORAL DAILY
Qty: 3 TABLET | Refills: 0 | Status: SHIPPED | OUTPATIENT
Start: 2022-01-19 | End: 2022-01-22

## 2022-01-19 RX ADMIN — SODIUM CHLORIDE 1000 ML: 9 INJECTION, SOLUTION INTRAVENOUS at 19:30

## 2022-01-19 RX ADMIN — KETOROLAC TROMETHAMINE 15 MG: 30 INJECTION, SOLUTION INTRAMUSCULAR; INTRAVENOUS at 19:29

## 2022-01-19 RX ADMIN — WATER 2 G: 1 INJECTION, SOLUTION INTRAMUSCULAR; INTRAVENOUS; SUBCUTANEOUS at 20:42

## 2022-01-19 NOTE — ED TRIAGE NOTES
Patient presents ambulatory to treatment area with a steady gait. Patient complains of chills, dysuria, mid back pain that began yesterday morning. Symptoms have persisted today. Patient states she has also had headache, sore throat, and rhinorrhea. Patient took home covid test that was negative. Patient had flu and covid vaccines.

## 2022-01-19 NOTE — ED PROVIDER NOTES
HPI   62-year-old female with a past medical history of breast cancer in remission, diabetes, hypertension, and sarcoidosis who presents to the emergency department due to chills and left-sided back pain. She developed the symptoms yesterday. She feels like she has an achy feeling on the left side of her back that is dull and constant. It is not colicky. She has also had some dysuria and urinary frequency. She has not had a fever. She has a chronic cough secondary to her sarcoidosis, and this is actually gotten better recently. She denies chest pain or shortness of breath. She denies nausea, vomiting, or diarrhea. She says she has had many urinary tract infections in the past.     Past Medical History:   Diagnosis Date    Anxiety     Arthritis     Bell's palsy 08/1997    Breast cancer ([address] 2012    right DCIS    Bronchitis     Diabetes ([address]     GERD (gastroesophageal reflux disease)     Hypertension     Kidney stones     Morbid obesity ([address]     Pneumonia     Sarcoidosis     Spinal stenosis     Uterine cancer ([address] 2013       Past Surgical History:   Procedure Laterality Date    HX BARTHOLIN CYST MARSUPIALIZATION      HX BREAST BIOPSY Right 10/29/2019    RIGHT BREAST EXCISIONAL BIOPSY WITH BRACKETED NEEDLE LOCALIZATION performed by Malu Yee MD at [address] HX BREAST LUMPECTOMY      right    HX CHOLECYSTECTOMY      HX ENDOSCOPY  10/2017    Upper GI    HX HYSTERECTOMY      IR INJ FORAMIN EPID LUMB ANES/STER SNGL  8/14/2019         History reviewed. No pertinent family history. Social History     Socioeconomic History    Marital status:      Spouse name: Not on file    Number of children: Not on file    Years of education: Not on file    Highest education level: Not on file   Occupational History    Not on file   Tobacco Use    Smoking status: Never Smoker    Smokeless tobacco: Never Used   Substance and Sexual Activity    Alcohol use:  Yes Comment: rare    Drug use: No    Sexual activity: Not on file   Other Topics Concern    Not on file   Social History Narrative    Not on file     Social Determinants of Health     Financial Resource Strain:     Difficulty of Paying Living Expenses: Not on file   Food Insecurity:     Worried About Running Out of Food in the Last Year: Not on file    Prem of Food in the Last Year: Not on file   Transportation Needs:     Lack of Transportation (Medical): Not on file    Lack of Transportation (Non-Medical): Not on file   Physical Activity:     Days of Exercise per Week: Not on file    Minutes of Exercise per Session: Not on file   Stress:     Feeling of Stress : Not on file   Social Connections:     Frequency of Communication with Friends and Family: Not on file    Frequency of Social Gatherings with Friends and Family: Not on file    Attends Nondenominational Services: Not on file    Active Member of [address] [place] [address] [phone] or Organizations: Not on file    Attends Club or Organization Meetings: Not on file    Marital Status: Not on file   Intimate Partner Violence:     Fear of Current or Ex-Partner: Not on file    Emotionally Abused: Not on file    Physically Abused: Not on file    Sexually Abused: Not on file   Housing Stability:     Unable to Pay for Housing in the Last Year: Not on file    Number of [place] in the Last Year: Not on file    Unstable Housing in the Last Year: Not on file         ALLERGIES: Prednisone    Review of Systems   A complete review of systems was performed and all systems reviewed were negative unless otherwise documented in the HPI. Vitals:    01/19/22 1838   BP: (!) 182/84   Pulse: 100   Resp: 18   Temp: 97.8 °F (36.6 °C)   SpO2: 99%   Weight: 128.4 kg (283 lb)   Height: 5' 7\" (1.702 m)            Physical Exam  Constitutional:       Comments: Appears well. No acute distress noted   Eyes:      General: No scleral icterus. Extraocular Movements: Extraocular movements intact. Neck:      Comments: Trachea midline  Cardiovascular:      Comments: Borderline tachycardia. Regular rhythm. No murmurs. Normal capillary refill  Pulmonary:      Effort: Pulmonary effort is normal. No respiratory distress. Breath sounds: Normal breath sounds. No wheezing or rales. Abdominal:      General: There is no distension. Palpations: Abdomen is soft. Tenderness: There is no abdominal tenderness. Comments: No appreciable CVA tenderness. Prior open cholecystectomy scar noted   Musculoskeletal:         General: No deformity. Normal range of motion. Cervical back: Normal range of motion. Comments: No thoracic or lumbar spinal tenderness   Skin:     General: Skin is warm and dry. Comments: No rashes on the flanks   Neurological:      Comments: Awake and alert. Speech is normal.  GCS 15          MDM   51-year-old female presents with the above chief complaint. Patient hypertensive but her other vital signs are stable. I do not appreciate any CVA tenderness. Abdomen is soft and nontender. Clinically she appears well. Labs and urinalysis have been ordered as has a chest x-ray and a COVID PCR. I do not suspect admission unless we find anything significantly abnormal on her work-up. I have a low suspicion for kidney stone, and I do not think she needs any cross-sectional imaging unless she has significant hematuria on her urinalysis. He will be given some Toradol for her symptoms    Labs notable for leukocytosis of over 13. The remainder of her labs are unremarkable. Urinalysis positive for nitrates and leukocyte esterase. She has minimal red blood cells. I do have a low suspicion for kidney stone. Patient Dors improvement after Toradol. She was given Rocephin. She will be prescribed cefdinir and she requested a prescription for Diflucan which will be provided.   Return precautions provided and patient discharged in stable condition    Procedures

## 2022-01-20 LAB
SARS-COV-2, XPLCVT: NOT DETECTED
SOURCE, COVRS: NORMAL

## 2022-01-20 NOTE — ED NOTES
The patient was discharged home in stable condition. The patient is alert and oriented, in no respiratory distress. The patient's diagnosis, condition and treatment were explained. The patient expressed understanding. Prescriptionse-scribed to pharmacy. No work/school note given. A discharge plan has been developed. A  was not involved in the process. Aftercare instructions were given. Pt ambulatory out of the ED.

## 2022-01-20 NOTE — ED NOTES
Bedside shift change report given to [address] (oncoming nurse) by Cyril Llamas RN (offgoing nurse). Report included the following information SBAR.

## 2022-01-20 NOTE — DISCHARGE INSTRUCTIONS
Return to the emergency department if your symptoms are worsening, if you have a fever that does not improve with Tylenol or ibuprofen, have severe abdominal pain, or unable to eat or drink, or develop any other symptoms you find concerning. In the meantime please start the antibiotics tomorrow morning. You can take ibuprofen as needed for pain. He should follow-up with your primary care doctor soon as possible as well. Use the [employer] application online to follow-up on your COVID-19 testing result and you should remain quarantine until this test results.

## 2022-02-22 ENCOUNTER — APPOINTMENT (OUTPATIENT)
Dept: CT IMAGING | Age: 62
End: 2022-02-22
Attending: PHYSICIAN ASSISTANT
Payer: COMMERCIAL

## 2022-02-22 ENCOUNTER — APPOINTMENT (OUTPATIENT)
Dept: GENERAL RADIOLOGY | Age: 62
End: 2022-02-22
Attending: PHYSICIAN ASSISTANT
Payer: COMMERCIAL

## 2022-02-22 ENCOUNTER — HOSPITAL ENCOUNTER (EMERGENCY)
Age: 62
Discharge: HOME | End: 2022-02-22
Attending: EMERGENCY MEDICINE
Payer: COMMERCIAL

## 2022-02-22 VITALS
OXYGEN SATURATION: 97 % | SYSTOLIC BLOOD PRESSURE: 176 MMHG | DIASTOLIC BLOOD PRESSURE: 79 MMHG | RESPIRATION RATE: 16 BRPM | HEIGHT: 67 IN | BODY MASS INDEX: 43.79 KG/M2 | WEIGHT: 279 LBS | TEMPERATURE: 99 F | HEART RATE: 106 BPM

## 2022-02-22 DIAGNOSIS — Z20.822 ENCOUNTER FOR LABORATORY TESTING FOR COVID-19 VIRUS: Primary | ICD-10-CM

## 2022-02-22 DIAGNOSIS — M54.50 LOW BACK PAIN WITHOUT SCIATICA, UNSPECIFIED BACK PAIN LATERALITY, UNSPECIFIED CHRONICITY: ICD-10-CM

## 2022-02-22 LAB
ALBUMIN SERPL-MCNC: 3.6 G/DL (ref 3.5–5)
ALBUMIN/GLOB SERPL: 0.7 {RATIO} (ref 1.1–2.2)
ALP SERPL-CCNC: 69 U/L (ref 45–117)
ALT SERPL-CCNC: 30 U/L (ref 12–78)
ANION GAP SERPL CALC-SCNC: 12 MMOL/L (ref 5–15)
APPEARANCE UR: CLEAR
AST SERPL-CCNC: 31 U/L (ref 15–37)
BACTERIA URNS QL MICRO: NEGATIVE /HPF
BASOPHILS # BLD: 0 K/UL (ref 0–0.1)
BASOPHILS NFR BLD: 0 % (ref 0–1)
BILIRUB SERPL-MCNC: 0.3 MG/DL (ref 0.2–1)
BILIRUB UR QL: NEGATIVE
BUN SERPL-MCNC: 23 MG/DL (ref 6–20)
BUN/CREAT SERPL: 20 (ref 12–20)
CALCIUM SERPL-MCNC: 10.7 MG/DL (ref 8.5–10.1)
CAOX CRY URNS QL MICRO: ABNORMAL
CHLORIDE SERPL-SCNC: 99 MMOL/L (ref 97–108)
CO2 SERPL-SCNC: 24 MMOL/L (ref 21–32)
COLOR UR: ABNORMAL
CREAT SERPL-MCNC: 1.15 MG/DL (ref 0.55–1.02)
DIFFERENTIAL METHOD BLD: ABNORMAL
EOSINOPHIL # BLD: 0.2 K/UL (ref 0–0.4)
EOSINOPHIL NFR BLD: 2 % (ref 0–7)
EPITH CASTS URNS QL MICRO: ABNORMAL /LPF
ERYTHROCYTE [DISTWIDTH] IN BLOOD BY AUTOMATED COUNT: 18.1 % (ref 11.5–14.5)
GLOBULIN SER CALC-MCNC: 5.1 G/DL (ref 2–4)
GLUCOSE SERPL-MCNC: 101 MG/DL (ref 65–100)
GLUCOSE UR STRIP.AUTO-MCNC: NEGATIVE MG/DL
HCT VFR BLD AUTO: 38.6 % (ref 35–47)
HGB BLD-MCNC: 11.4 G/DL (ref 11.5–16)
HGB UR QL STRIP: ABNORMAL
IMM GRANULOCYTES # BLD AUTO: 0 K/UL
IMM GRANULOCYTES NFR BLD AUTO: 0 %
KETONES UR QL STRIP.AUTO: NEGATIVE MG/DL
LACTATE SERPL-SCNC: 1.8 MMOL/L (ref 0.4–2)
LACTATE SERPL-SCNC: 2.2 MMOL/L (ref 0.4–2)
LEUKOCYTE ESTERASE UR QL STRIP.AUTO: NEGATIVE
LYMPHOCYTES # BLD: 2.7 K/UL (ref 0.8–3.5)
LYMPHOCYTES NFR BLD: 28 % (ref 12–49)
MCH RBC QN AUTO: 22.8 PG (ref 26–34)
MCHC RBC AUTO-ENTMCNC: 29.5 G/DL (ref 30–36.5)
MCV RBC AUTO: 77.4 FL (ref 80–99)
MONOCYTES # BLD: 0.7 K/UL (ref 0–1)
MONOCYTES NFR BLD: 7 % (ref 5–13)
NEUTS SEG # BLD: 6 K/UL (ref 1.8–8)
NEUTS SEG NFR BLD: 63 % (ref 32–75)
NITRITE UR QL STRIP.AUTO: NEGATIVE
NRBC # BLD: 0 K/UL (ref 0–0.01)
NRBC BLD-RTO: 0 PER 100 WBC
PH UR STRIP: 6 [PH] (ref 5–8)
PLATELET # BLD AUTO: 294 K/UL (ref 150–400)
PMV BLD AUTO: 9 FL (ref 8.9–12.9)
POTASSIUM SERPL-SCNC: 4.3 MMOL/L (ref 3.5–5.1)
PROT SERPL-MCNC: 8.7 G/DL (ref 6.4–8.2)
PROT UR STRIP-MCNC: NEGATIVE MG/DL
RBC # BLD AUTO: 4.99 M/UL (ref 3.8–5.2)
RBC #/AREA URNS HPF: ABNORMAL /HPF (ref 0–5)
RBC MORPH BLD: ABNORMAL
SARS-COV-2, COV2: NORMAL
SODIUM SERPL-SCNC: 135 MMOL/L (ref 136–145)
SP GR UR REFRACTOMETRY: >1.03 (ref 1–1.03)
UR CULT HOLD, URHOLD: NORMAL
UROBILINOGEN UR QL STRIP.AUTO: 0.2 EU/DL (ref 0.2–1)
WBC # BLD AUTO: 9.6 K/UL (ref 3.6–11)
WBC URNS QL MICRO: ABNORMAL /HPF (ref 0–4)

## 2022-02-22 PROCEDURE — 80053 COMPREHEN METABOLIC PANEL: CPT

## 2022-02-22 PROCEDURE — U0005 INFEC AGEN DETEC AMPLI PROBE: HCPCS

## 2022-02-22 PROCEDURE — 36415 COLL VENOUS BLD VENIPUNCTURE: CPT

## 2022-02-22 PROCEDURE — 99283 EMERGENCY DEPT VISIT LOW MDM: CPT

## 2022-02-22 PROCEDURE — 83605 ASSAY OF LACTIC ACID: CPT

## 2022-02-22 PROCEDURE — 74011000636 HC RX REV CODE- 636: Performed by: EMERGENCY MEDICINE

## 2022-02-22 PROCEDURE — 96374 THER/PROPH/DIAG INJ IV PUSH: CPT

## 2022-02-22 PROCEDURE — 85025 COMPLETE CBC W/AUTO DIFF WBC: CPT

## 2022-02-22 PROCEDURE — 71045 X-RAY EXAM CHEST 1 VIEW: CPT

## 2022-02-22 PROCEDURE — 74011250636 HC RX REV CODE- 250/636: Performed by: PHYSICIAN ASSISTANT

## 2022-02-22 PROCEDURE — 81001 URINALYSIS AUTO W/SCOPE: CPT

## 2022-02-22 PROCEDURE — 74177 CT ABD & PELVIS W/CONTRAST: CPT

## 2022-02-22 RX ORDER — DICLOFENAC SODIUM 75 MG/1
75 TABLET, DELAYED RELEASE ORAL 2 TIMES DAILY
Qty: 15 TABLET | Refills: 0 | Status: SHIPPED | OUTPATIENT
Start: 2022-02-22

## 2022-02-22 RX ORDER — ZINC GLUCONATE 10 MG
LOZENGE ORAL
COMMUNITY

## 2022-02-22 RX ORDER — ASCORBIC ACID 500 MG
500 TABLET ORAL
COMMUNITY

## 2022-02-22 RX ORDER — KETOROLAC TROMETHAMINE 30 MG/ML
15 INJECTION, SOLUTION INTRAMUSCULAR; INTRAVENOUS
Status: COMPLETED | OUTPATIENT
Start: 2022-02-22 | End: 2022-02-22

## 2022-02-22 RX ADMIN — SODIUM CHLORIDE 1000 ML: 9 INJECTION, SOLUTION INTRAVENOUS at 19:10

## 2022-02-22 RX ADMIN — KETOROLAC TROMETHAMINE 15 MG: 30 INJECTION, SOLUTION INTRAMUSCULAR; INTRAVENOUS at 19:10

## 2022-02-22 RX ADMIN — IOPAMIDOL 100 ML: 755 INJECTION, SOLUTION INTRAVENOUS at 20:14

## 2022-02-22 NOTE — ED PROVIDER NOTES
Lary Rangel is a 63 y.o. female with PMhx of breast cancer in remission, diabetes, hypertension, and sarcoidosis  who presents to ED with cc of urinary symptoms and low back pain. Patient states she was seen in this ED approximately 1 month prior with similar symptoms, DX pyelonephritis. Per chart review, patient was given Rocephin in ED and placed on course of Omnicef. Patient states her symptoms improved however did not resolve completely so upon her follow-up with her PCP, when they also noted hematuria, they gave a second course of the same medication. Patient states she finished that course the end of last week. Patient states that over the weekend, she felt at her baseline. She does note that she started with nasal congestion, postnasal drip and a slight cough. Note she is vaccinated for COVID and denies any known sick contacts. Took a home test which was noted to be negative. States when she started with return of dysuria, urinary frequency, chills, low back pain and temperature of 100.4 F yesterday, she came to be evaluated today. Notes history of diabetes. States her glucose regularly runs between 115130 in the mornings. Pt denies additional symptoms of CP, SOB, constipation (at baseline, on Tramadol and probiotic, LBM Sunday), diarrhea, abdominal pain, vomiting. States she has intermittent nausea but this is at her baseline and associates with frequency of BMs. PMHx: Reviewed, as below. PSHx: Reviewed, as below. PCP: Amanda Riley MD    There are no other complaints verbalized at this time.                Past Medical History:   Diagnosis Date    Anxiety     Arthritis     Bell's palsy 08/1997    Breast cancer ([address] 2012    right DCIS    Bronchitis     Diabetes ([address]     GERD (gastroesophageal reflux disease)     Hypertension     Kidney stones     Morbid obesity ([address]     Pneumonia     Sarcoidosis     Spinal stenosis     Uterine cancer ([address] 2013       Past Surgical History:   Procedure Laterality Date    HX BARTHOLIN CYST MARSUPIALIZATION      HX BREAST BIOPSY Right 10/29/2019    RIGHT BREAST EXCISIONAL BIOPSY WITH BRACKETED NEEDLE LOCALIZATION performed by Leanne Hoffman MD at [facility] [place] AM[facility]ULATORY OR    HX BREAST LUMPECTOMY      right    HX CHOLECYSTECTOMY      HX ENDOSCOPY  10/2017    Upper GI    HX HYSTERECTOMY      IR INJ FORAMIN EPID LUMB ANES/STER SNGL  8/14/2019         History reviewed. No pertinent family history. Social History     Socioeconomic History    Marital status:      Spouse name: Not on file    Number of children: Not on file    Years of education: Not on file    Highest education level: Not on file   Occupational History    Not on file   Tobacco Use    Smoking status: Never Smoker    Smokeless tobacco: Never Used   Substance and Sexual Activity    Alcohol use: Yes     Comment: rare    Drug use: No    Sexual activity: Not on file   Other Topics Concern    Not on file   Social History Narrative    Not on file     Social Determinants of Health     Financial Resource Strain:     Difficulty of Paying Living Expenses: Not on file   Food Insecurity:     Worried About Running Out of Food in the Last Year: Not on file    Prem of Food in the Last Year: Not on file   Transportation Needs:     Lack of Transportation (Medical): Not on file    Lack of Transportation (Non-Medical):  Not on file   Physical Activity:     Days of Exercise per Week: Not on file    Minutes of Exercise per Session: Not on file   Stress:     Feeling of Stress : Not on file   Social Connections:     Frequency of Communication with Friends and Family: Not on file    Frequency of Social Gatherings with Friends and Family: Not on file    Attends Latter-day Services: Not on file    Active Member of Clubs or Organizations: Not on file    Attends Club or Organization Meetings: Not on file    Marital Status: Not on file   Intimate Partner Violence:     Fear of Current or Ex-Partner: Not on file    Emotionally Abused: Not on file    Physically Abused: Not on file    Sexually Abused: Not on file   Housing Stability:     Unable to Pay for Housing in the Last Year: Not on file    Number of Places Lived in the Last Year: Not on file    Unstable Housing in the Last Year: Not on file         ALLERGIES: Prednisone    Review of Systems   Constitutional: Positive for fever. HENT: Positive for congestion. Respiratory: Positive for cough. Negative for shortness of breath. Cardiovascular: Negative for chest pain. Gastrointestinal: Negative for abdominal pain, constipation, diarrhea, nausea and vomiting. Genitourinary: Positive for dysuria and frequency. Musculoskeletal: Positive for back pain. All other systems reviewed and are negative. Vitals:    02/22/22 1831 02/22/22 1850   BP: (!) 176/79    Pulse: (!) 106    Resp: 16    Temp: 99 °F (37.2 °C)    SpO2: 97% 97%   Weight: 126.6 kg (279 lb)    Height: 5' 7\" (1.702 m)             Physical Exam  Vitals and nursing note reviewed. Constitutional:       Appearance: Normal appearance. She is not diaphoretic. HENT:      Head: Atraumatic. Right Ear: External ear normal.      Left Ear: External ear normal.   Eyes:      Conjunctiva/sclera: Conjunctivae normal.   Cardiovascular:      Rate and Rhythm: Regular rhythm. Tachycardia present. Heart sounds: Normal heart sounds. No murmur heard. No friction rub. No gallop. Pulmonary:      Effort: No respiratory distress. Breath sounds: Normal breath sounds. No stridor. No wheezing, rhonchi or rales. Abdominal:      General: Bowel sounds are normal. There is no distension. Palpations: Abdomen is soft. Tenderness: There is no abdominal tenderness. There is no guarding or rebound. Hernia: No hernia is present. Comments: No appreciable CVAT. Musculoskeletal:         General: No swelling.       Cervical back: Normal range of motion. No rigidity. Skin:     General: Skin is warm and dry. Neurological:      Mental Status: She is alert and oriented to person, place, and time. Mental status is at baseline. MDM  Number of Diagnoses or Management Options     Amount and/or Complexity of Data Reviewed  Clinical lab tests: ordered and reviewed  Tests in the radiology section of CPT®: ordered and reviewed  Discuss the patient with other providers: yes (Dr Pedro Ocasio, ED attending)           Procedures          8:17 PM  Change of shift. Care of patient signed over to Dr Pedro Ocasio, ED attending. Bedside handoff complete. Awaiting imaging results. VITAL SIGNS:  Vitals:    02/22/22 1831 02/22/22 1850   BP: (!) 176/79    Pulse: (!) 106    Resp: 16    Temp: 99 °F (37.2 °C)    SpO2: 97% 97%   Weight: 126.6 kg (279 lb)    Height: 5' 7\" (1.702 m)          LABS:  Recent Results (from the past 24 hour(s))   CBC WITH AUTOMATED DIFF    Collection Time: 02/22/22  7:01 PM   Result Value Ref Range    WBC 9.6 3.6 - 11.0 K/uL    RBC 4.99 3.80 - 5.20 M/uL    HGB 11.4 (L) 11.5 - 16.0 g/dL    HCT 38.6 35.0 - 47.0 %    MCV 77.4 (L) 80.0 - 99.0 FL    MCH 22.8 (L) 26.0 - 34.0 PG    MCHC 29.5 (L) 30.0 - 36.5 g/dL    RDW 18.1 (H) 11.5 - 14.5 %    PLATELET 301 930 - 109 K/uL    MPV 9.0 8.9 - 12.9 FL    NRBC 0.0 0.0  WBC    ABSOLUTE NRBC 0.00 0.00 - 0.01 K/uL    NEUTROPHILS 63 32 - 75 %    LYMPHOCYTES 28 12 - 49 %    MONOCYTES 7 5 - 13 %    EOSINOPHILS 2 0 - 7 %    BASOPHILS 0 0 - 1 %    IMMATURE GRANULOCYTES 0 %    ABS. NEUTROPHILS 6.0 1.8 - 8.0 K/UL    ABS. LYMPHOCYTES 2.7 0.8 - 3.5 K/UL    ABS. MONOCYTES 0.7 0.0 - 1.0 K/UL    ABS. EOSINOPHILS 0.2 0.0 - 0.4 K/UL    ABS. BASOPHILS 0.0 0.0 - 0.1 K/UL    ABS. IMM.  GRANS. 0.0 K/UL    DF MANUAL      RBC COMMENTS ANISOCYTOSIS  1+        RBC COMMENTS DANETTE CELLS  PRESENT        RBC COMMENTS OVALOCYTES  PRESENT       METABOLIC PANEL, COMPREHENSIVE    Collection Time: 02/22/22  7:01 PM Result Value Ref Range    Sodium 135 (L) 136 - 145 mmol/L    Potassium 4.3 3.5 - 5.1 mmol/L    Chloride 99 97 - 108 mmol/L    CO2 24 21 - 32 mmol/L    Anion gap 12 5 - 15 mmol/L    Glucose 101 (H) 65 - 100 mg/dL    BUN 23 (H) 6 - 20 MG/DL    Creatinine 1.15 (H) 0.55 - 1.02 MG/DL    BUN/Creatinine ratio 20 12 - 20      GFR est AA 59 (L) >60 ml/min/1.73m2    GFR est non-AA 48 (L) >60 ml/min/1.73m2    Calcium 10.7 (H) 8.5 - 10.1 MG/DL    Bilirubin, total 0.3 0.2 - 1.0 MG/DL    ALT (SGPT) 30 12 - 78 U/L    AST (SGOT) 31 15 - 37 U/L    Alk. phosphatase 69 45 - 117 U/L    Protein, total 8.7 (H) 6.4 - 8.2 g/dL    Albumin 3.6 3.5 - 5.0 g/dL    Globulin 5.1 (H) 2.0 - 4.0 g/dL    A-G Ratio 0.7 (L) 1.1 - 2.2     LACTIC ACID    Collection Time: 02/22/22  7:01 PM   Result Value Ref Range    Lactic acid 2.2 (HH) 0.4 - 2.0 MMOL/L   URINALYSIS W/MICROSCOPIC    Collection Time: 02/22/22  7:05 PM   Result Value Ref Range    Color YELLOW/STRAW      Appearance CLEAR CLEAR      Specific gravity >1.030 (H) 1.003 - 1.030    pH (UA) 6.0 5.0 - 8.0      Protein Negative NEG mg/dL    Glucose Negative NEG mg/dL    Ketone Negative NEG mg/dL    Bilirubin Negative NEG      Blood SMALL (A) NEG      Urobilinogen 0.2 0.2 - 1.0 EU/dL    Nitrites Negative NEG      Leukocyte Esterase Negative NEG      WBC 0-4 0 - 4 /hpf    RBC 0-5 0 - 5 /hpf    Epithelial cells FEW FEW /lpf    Bacteria Negative NEG /hpf    CA Oxalate crystals 2+ (A) NEG   URINE CULTURE HOLD SAMPLE    Collection Time: 02/22/22  7:05 PM    Specimen: Serum; Urine   Result Value Ref Range    Urine culture hold        Urine on hold in Microbiology dept for 2 days. If unpreserved urine is submitted, it cannot be used for addtional testing after 24 hours, recollection will be required.    SARS-COV-2    Collection Time: 02/22/22  7:05 PM   Result Value Ref Range    SARS-CoV-2 Please find results under separate order           IMAGING:  XR CHEST PORT    (Results Pending)   CT ABD PELV W CONT (Results Pending)         Medications During Visit:  Medications   ketorolac (TORADOL) injection 15 mg (15 mg IntraVENous Given 2/22/22 1910)   sodium chloride 0.9 % bolus infusion 1,000 mL (1,000 mL IntraVENous New Bag 2/22/22 1910)   iopamidoL (ISOVUE-370) 76 % injection 100 mL (100 mL IntraVENous Given 2/22/22 2014)         DECISION MAKING:    Melissa Iqbal is a 63 y.o. female who comes in as above. Given recent treatment for pyelonephritis, will obtain UA, blood work and CT. Given complaints of upper respiratory symptoms and cough, will obtain chest x-ray and Covid test and we have discussed Covid precautions going forward. End of shift occurred prior to return of testing. Patient signed out to Dr. Clint Frias, ED attending. I have discussed care with ED attending. Opportunity for questions presented. Pt verbalizes their understanding and agreement with care plan. DISPOSITION:  Pending        Please note that this dictation was completed with [employer], the computer voice recognition software. Quite often unanticipated grammatical, syntax, homophones, and other interpretive errors are inadvertently transcribed by the computer software. Please disregard these errors. Please excuse any errors that have escaped final proofreading.

## 2022-02-22 NOTE — ED TRIAGE NOTES
Patient presents ambulatory to treatment area with a steady gait. Patient states she was seen about one month ago for pyelonephritis and then saw PCP. Patient has completed two courses of antibiotics since then. Patient states yesterday, she began again with urinary pain, chills, fever, sweats, and back pain. Patient states she has a postnasal drip and a cough and thought maybe the back pain was due to the cough, but decided to be checked out based on other symptoms.

## 2022-02-23 ENCOUNTER — PATIENT OUTREACH (OUTPATIENT)
Dept: CASE MANAGEMENT | Age: 62
End: 2022-02-23

## 2022-02-23 LAB
SARS-COV-2, XPLCVT: DETECTED
SOURCE, COVRS: ABNORMAL

## 2022-02-23 NOTE — ED NOTES
The patient was discharged home by Dr. Chris Mcgregor and Sven Flores rn in stable condition. The patient is alert and oriented, is in no respiratory distress and has vital signs within normal limits . The patient's diagnosis, condition and treatment were explained to patient. The patient expressed understanding. No prescriptions given to pt. No work/school note given to pt. A discharge plan has been developed. A  was not involved in the process. Aftercare instructions were given to the patient. Pt's saline lock removed without complications.

## 2022-02-23 NOTE — ED NOTES
Report given to Magdalene DE DIOS; reviewed assessment, MAR, POC. Given a chance to ask questions and clarify.

## 2022-02-23 NOTE — PROGRESS NOTES
Patient contacted regarding COVID-19 diagnosis. Discussed COVID-19 related testing which was available at this time. Test results were positive. Patient informed of results, if available? yes. Ambulatory Care Manager contacted the patient by telephone to perform post discharge assessment. Call within 2 business days of discharge: Yes Verified name and  with patient as identifiers. Provided introduction to self, and explanation of the CTN/ACM role, and reason for call due to risk factors for infection and/or exposure to COVID-19. Symptoms reviewed with patient who verbalized the following symptoms: no new symptoms and no worsening symptoms      Due to no new or worsening symptoms encounter was not routed to provider for escalation. Discussed follow-up appointments. If no appointment was previously scheduled, appointment scheduling offered:  no. [facility] follow up appointment(s): No future appointments. Non-[facility] follow up appointment(s): Interventions to address risk factors: Obtained and reviewed discharge summary and/or continuity of care documents     Advance Care Planning:   Does patient have an Advance Directive: not on file. Educated patient about risk for severe COVID-19 due to risk factors according to CDC guidelines. ACM reviewed discharge instructions, medical action plan and red flag symptoms with the patient who verbalized understanding. Discussed COVID vaccination status: yes. Education provided on COVID-19 vaccination as appropriate. Discussed exposure protocols and quarantine with CDC Guidelines. Patient was given an opportunity to verbalize any questions and concerns and agrees to contact ACM or health care provider for questions related to their healthcare. Reviewed and educated patient on any new and changed medications related to discharge diagnosis     Was patient discharged with a pulse oximeter?  no Discussed and confirmed pulse oximeter discharge instructions and when to notify provider or seek emergency care. ACM provided contact information. Plan for follow-up call in 5-7 days based on severity of symptoms and risk factors.

## 2022-02-23 NOTE — ED NOTES
I discussed the lab and CT results with the patient. Discussed that the enlarged spleen and the retroperitoneal lymph nodes. The patient was aware of the lymph nodes. It is unclear what is causing the splenomegaly. She was advised to follow-up with her PCP over the coming days and to take the CT report with her.

## 2022-03-04 ENCOUNTER — PATIENT OUTREACH (OUTPATIENT)
Dept: CASE MANAGEMENT | Age: 62
End: 2022-03-04

## 2022-03-04 NOTE — PROGRESS NOTES
Patient resolved from [address] Transitions episode on 3/4/22. Discussed COVID-19 related testing which was available at this time. Test results were positive. Patient informed of results, if available? yes     Patient/family has been provided the following resources and education related to COVID-19:                         Signs, symptoms and red flags related to COVID-19            [facility] exposure and quarantine guidelines            [employer] exposure contact - [phone]            Contact for their local Department of Health                 Patient currently reports that the following symptoms have improved:  no new symptoms and no worsening symptoms. No further outreach scheduled with this CTN/ACM/LPN/HC/ MA. Episode of Care resolved. Patient has this CTN/ACM/LPN/HC/MA contact information if future needs arise.

## 2022-03-19 PROBLEM — Z86.000 HISTORY OF DUCTAL CARCINOMA IN SITU (DCIS) OF BREAST: Status: ACTIVE | Noted: 2019-08-19

## 2022-03-19 PROBLEM — E66.01 OBESITY, MORBID (HCC): Status: ACTIVE | Noted: 2019-08-19

## 2022-03-19 PROBLEM — Z91.89 AT HIGH RISK FOR BREAST CANCER: Status: ACTIVE | Noted: 2019-08-19

## 2022-06-13 ENCOUNTER — TRANSCRIBE ORDER (OUTPATIENT)
Dept: SCHEDULING | Age: 62
End: 2022-06-13

## 2022-06-13 DIAGNOSIS — R16.1 SPLENOMEGALY: Primary | ICD-10-CM

## 2023-02-19 ENCOUNTER — HOSPITAL ENCOUNTER (EMERGENCY)
Age: 63
Discharge: HOME | End: 2023-02-19
Attending: EMERGENCY MEDICINE
Payer: COMMERCIAL

## 2023-02-19 VITALS
DIASTOLIC BLOOD PRESSURE: 73 MMHG | OXYGEN SATURATION: 100 % | WEIGHT: 282 LBS | HEART RATE: 85 BPM | RESPIRATION RATE: 20 BRPM | HEIGHT: 67 IN | BODY MASS INDEX: 44.26 KG/M2 | TEMPERATURE: 98.8 F | SYSTOLIC BLOOD PRESSURE: 184 MMHG

## 2023-02-19 DIAGNOSIS — S09.90XA CLOSED HEAD INJURY, INITIAL ENCOUNTER: Primary | ICD-10-CM

## 2023-02-19 DIAGNOSIS — S83.91XA SPRAIN OF RIGHT KNEE, UNSPECIFIED LIGAMENT, INITIAL ENCOUNTER: ICD-10-CM

## 2023-02-19 PROCEDURE — 99282 EMERGENCY DEPT VISIT SF MDM: CPT

## 2023-02-20 NOTE — ED PROVIDER NOTES
The history is provided by the patient. Fall  The accident occurred 6 to 12 hours ago. She fell from a height of 1 - 2 ft. The point of impact was the head. The pain is present in the head and right knee. The pain is moderate. She was Ambulatory at the scene. Pertinent negatives include no vomiting and no loss of consciousness. Past Medical History:   Diagnosis Date    Anxiety     Arthritis     Bell's palsy 08/1997    Breast cancer ([facility] [address] 2012    right DCIS    Bronchitis     Diabetes ([facility] [address]     GERD (gastroesophageal reflux disease)     Hypertension     Kidney stones     Morbid obesity ([facility] [address]     Pneumonia     Sarcoidosis     Spinal stenosis     Uterine cancer ([facility] [address] 2013       Past Surgical History:   Procedure Laterality Date    HX BARTHOLIN CYST MARSUPIALIZATION      HX BREAST BIOPSY Right 10/29/2019    RIGHT BREAST EXCISIONAL BIOPSY WITH BRACKETED NEEDLE LOCALIZATION performed by Ann Mixon MD at [facility]    HX BREAST LUMPECTOMY      right    HX CHOLECYSTECTOMY      HX ENDOSCOPY  10/2017    Upper GI    HX HYSTERECTOMY      IR INJ FORAMIN EPID LUMB ANES/STER SNGL  8/14/2019         No family history on file.     Social History     Socioeconomic History    Marital status:      Spouse name: Not on file    Number of children: Not on file    Years of education: Not on file    Highest education level: Not on file   Occupational History    Not on file   Tobacco Use    Smoking status: Never    Smokeless tobacco: Never   Substance and Sexual Activity    Alcohol use: Yes     Comment: rare    Drug use: No    Sexual activity: Not on file   Other Topics Concern    Not on file   Social History Narrative    Not on file     Social Determinants of Health     Financial Resource Strain: Not on file   Food Insecurity: Not on file   Transportation Needs: Not on file   Physical Activity: Not on file   Stress: Not on file   Social Connections: Not on file   Intimate Partner Violence: Not on file Housing Stability: Not on file         ALLERGIES: Prednisone    Review of Systems   Gastrointestinal:  Negative for vomiting. Musculoskeletal:  Positive for arthralgias (right knee) and gait problem (ambulatory with a cane). Neurological:  Negative for loss of consciousness. All other systems reviewed and are negative. Vitals:    02/19/23 2025   BP: (!) (P) 184/73   Pulse: (P) 85   Resp: (P) 20   SpO2: (P) 100%            Physical Exam  Vitals and nursing note reviewed. Constitutional:       Appearance: She is well-developed. She is not ill-appearing. HENT:      Head: Normocephalic and atraumatic. Eyes:      General: No scleral icterus. Cardiovascular:      Rate and Rhythm: Normal rate. Pulmonary:      Effort: Pulmonary effort is normal.   Abdominal:      General: There is no distension. Musculoskeletal:      Cervical back: Normal range of motion. Right knee: No deformity or bony tenderness. Normal range of motion. No LCL laxity, MCL laxity, ACL laxity or PCL laxity. Normal alignment. Skin:     Findings: No erythema or rash. Neurological:      General: No focal deficit present. Mental Status: She is alert and oriented to person, place, and time. Psychiatric:         Mood and Affect: Mood normal.         Behavior: Behavior normal.        Medical Decision Making         Procedures        The patient presented after a fall. The patient is now resting comfortably and feels better, is alert and in no distress. The patient has a normal mental status and is neurologically intact. The history, exam, diagnostic testing (if any), and current condition do not demonstrate signs of clinically significant intra-cranial, intra-thoracic, intra-abdominal, or musculoskeletal trauma. The vital signs have been stable. The patient's condition is stable and appropriate for discharge.  The patient will pursue further outpatient evaluation with the primary care physician or other designated or consulting physician as indicated in the discharge instructions. IMPRESSION:  1. Closed head injury, initial encounter    2.  Sprain of right knee, unspecified ligament, initial encounter

## 2023-02-20 NOTE — ED NOTES
The patient was discharged home by Dr Manuel Bolanos  in stable condition. The patient is alert and oriented, in no respiratory distress. The patient's diagnosis, condition and treatment were explained. The patient expressed understanding. No prescriptions given/e-scribed to pharmacy. No work/school note given. A discharge plan has been developed. A  was not involved in the process. Aftercare instructions were given. Pt ambulatory out of the ED.

## 2023-02-20 NOTE — ED TRIAGE NOTES
States she slipped of second to the last step fell to right side hitting head on wooden railing denies loss of consciousness, vomiting does not take take blood thinners. Palpapble hematoma on back of head. Fall occurred 0930 today, also c/o right knee pain.

## 2025-02-06 ENCOUNTER — OFFICE VISIT (OUTPATIENT)
Age: 65
End: 2025-02-06

## 2025-02-06 VITALS
RESPIRATION RATE: 15 BRPM | HEART RATE: 74 BPM | SYSTOLIC BLOOD PRESSURE: 131 MMHG | BODY MASS INDEX: 44.64 KG/M2 | DIASTOLIC BLOOD PRESSURE: 67 MMHG | TEMPERATURE: 98.4 F | OXYGEN SATURATION: 93 % | HEIGHT: 67 IN | WEIGHT: 284.4 LBS

## 2025-02-06 DIAGNOSIS — J10.1 INFLUENZA A: ICD-10-CM

## 2025-02-06 DIAGNOSIS — R05.1 ACUTE COUGH: Primary | ICD-10-CM

## 2025-02-06 LAB
INFLUENZA A ANTIGEN, POC: POSITIVE
INFLUENZA B ANTIGEN, POC: NEGATIVE
Lab: NORMAL
PERFORMING INSTRUMENT: NORMAL
QC PASS/FAIL: NORMAL
SARS-COV-2, POC: NORMAL

## 2025-02-06 RX ORDER — OSELTAMIVIR PHOSPHATE 75 MG/1
75 CAPSULE ORAL 2 TIMES DAILY
Qty: 10 CAPSULE | Refills: 0 | Status: SHIPPED | OUTPATIENT
Start: 2025-02-06 | End: 2025-02-11

## 2025-02-06 RX ORDER — PREDNISONE 20 MG/1
20 TABLET ORAL DAILY
COMMUNITY
Start: 2025-01-23 | End: 2025-02-06

## 2025-02-06 RX ORDER — HYDROXYCHLOROQUINE SULFATE 200 MG/1
200 TABLET, FILM COATED ORAL 2 TIMES DAILY
COMMUNITY

## 2025-02-06 RX ORDER — AMLODIPINE BESYLATE 10 MG/1
10 TABLET ORAL
COMMUNITY
Start: 2025-01-27 | End: 2025-04-27

## 2025-02-06 RX ORDER — CALCIUM CARBONATE 300MG(750)
400 TABLET,CHEWABLE ORAL DAILY
COMMUNITY

## 2025-02-06 RX ORDER — VITAMIN A ACETATE, BETA CAROTENE, ASCORBIC ACID, CHOLECALCIFEROL, .ALPHA.-TOCOPHEROL ACETATE, DL-, THIAMINE MONONITRATE, RIBOFLAVIN, NIACINAMIDE, PYRIDOXINE HYDROCHLORIDE, FOLIC ACID, CYANOCOBALAMIN, CALCIUM CARBONATE, FERROUS FUMARATE, ZINC OXIDE, CUPRIC OXIDE 3080; 12; 120; 400; 1; 1.84; 3; 20; 22; 920; 25; 200; 27; 10; 2 [IU]/1; UG/1; MG/1; [IU]/1; MG/1; MG/1; MG/1; MG/1; MG/1; [IU]/1; MG/1; MG/1; MG/1; MG/1; MG/1
1 TABLET, FILM COATED ORAL DAILY
COMMUNITY

## 2025-02-06 ASSESSMENT — ENCOUNTER SYMPTOMS
COUGH: 1
GASTROINTESTINAL NEGATIVE: 1
EYES NEGATIVE: 1

## 2025-02-06 NOTE — PROGRESS NOTES
Nayely Wong (:  1960) is a 64 y.o. female,New patient, here for evaluation of the following chief complaint(s):  Congestion (Pt c/o congestion, sx onset Monday. Pt states she also had a cough. Pt states she has tried using max drops. ), Generalized Body Aches (Pt c/o body aches, sx onset yesterday. Pt states she took a COVID test last night and it came back negative. ), Shortness of Breath (Pt c/o SOB, sx onset Monday.), and Fever (Pt c/o fever of a 100.3, sx onset yesterday. Pt states sx have subsided, she also states she took some tylenol for sx. )      Assessment & Plan :  Visit Diagnoses and Associated Orders       ORDERS WITHOUT AN ASSOCIATED DIAGNOSIS    predniSONE (DELTASONE) 20 MG tablet [6496]      hydroxychloroquine (PLAQUENIL) 200 MG tablet [94061]      amLODIPine (NORVASC) 10 MG tablet [9069]      Magnesium 400 MG TABS [853574]      Prenatal Vit-Fe Fumarate-FA (PRENATAL PLUS) 27-1 MG TABS [52989]      ferrous fumarate-vitamin c (JENNIFER-SEQUELS) 65-25 MG TBCR CR tablet [080582]      vitamin D (CHOLECALCIFEROL) 25 MCG (1000 UT) TABS tablet [12896]               Follow up in 3-4 days if symptoms persist or if symptoms worsen.       Subjective :       64 y.o. female presents with symptoms of with cough and cold symptoms.  Patient states that she does have underlying sarcoidosis.  She is worried about having COVID and flu.  Currently denies any shortness of breath difficulty breathing.         Vitals:    25 1430   BP: 131/67   Site: Left Upper Arm   Position: Sitting   Cuff Size: Large Adult   Pulse: 74   Resp: 15   Temp: 98.4 °F (36.9 °C)   TempSrc: Oral   SpO2: 93%   Weight: 129 kg (284 lb 6.4 oz)   Height: 1.702 m (5' 7\")       No results found for this visit on 25.   Review of Systems   Constitutional: Negative.    HENT: Negative.  Voice change: Tamiflu.    Eyes: Negative.    Respiratory:  Positive for cough.    Cardiovascular: Negative.    Gastrointestinal: Negative.

## (undated) DEVICE — CHEST PACK: Brand: MEDLINE INDUSTRIES, INC.

## (undated) DEVICE — SUT SLK 2-0SH 30IN BLK --

## (undated) DEVICE — NEEDLE HYPO 22GA L1.5IN BLK S STL HUB POLYPR SHLD REG BVL

## (undated) DEVICE — INFECTION CONTROL KIT SYS

## (undated) DEVICE — INSULATED BLADE ELECTRODE: Brand: EDGE

## (undated) DEVICE — STERILE POLYISOPRENE POWDER-FREE SURGICAL GLOVES: Brand: PROTEXIS

## (undated) DEVICE — COVER US PRB W12XL244CM FLD IORT STR TIP

## (undated) DEVICE — CANISTER, RIGID, 3000CC: Brand: MEDLINE INDUSTRIES, INC.

## (undated) DEVICE — SUTURE MCRYL SZ 4-0 L27IN ABSRB UD L19MM PS-2 1/2 CIR PRIM Y426H

## (undated) DEVICE — COVER LT HNDL PLAS RIG 1 PER PK

## (undated) DEVICE — SOL IRRIGATION INJ NACL 0.9% 500ML BTL

## (undated) DEVICE — DERMABOND SKIN ADH 0.7ML -- DERMABOND ADVANCED 12/BX

## (undated) DEVICE — SMOKE EVACUATION PENCIL: Brand: VALLEYLAB

## (undated) DEVICE — DEVICE TRNSF SPIK STL 2008S] MICROTEK MEDICAL INC]

## (undated) DEVICE — STRAP,POSITIONING,KNEE/BODY,FOAM,4X60": Brand: MEDLINE

## (undated) DEVICE — SUTURE VCRL SZ 3-0 L27IN ABSRB UD L26MM SH 1/2 CIR J416H